# Patient Record
Sex: FEMALE | Race: WHITE | NOT HISPANIC OR LATINO | Employment: OTHER | URBAN - NONMETROPOLITAN AREA
[De-identification: names, ages, dates, MRNs, and addresses within clinical notes are randomized per-mention and may not be internally consistent; named-entity substitution may affect disease eponyms.]

---

## 2019-09-12 PROBLEM — E11.9 DIABETES MELLITUS: Status: ACTIVE | Noted: 2019-09-12

## 2019-09-12 PROBLEM — G35 MULTIPLE SCLEROSIS: Status: ACTIVE | Noted: 2019-09-12

## 2019-09-12 PROBLEM — I73.9 PAD (PERIPHERAL ARTERY DISEASE): Status: ACTIVE | Noted: 2019-09-12

## 2019-09-12 PROBLEM — L89.96 PRESSURE INJURY OF DEEP TISSUE: Status: ACTIVE | Noted: 2019-09-12

## 2019-09-12 PROBLEM — I96 GANGRENE: Status: ACTIVE | Noted: 2019-09-12

## 2019-09-12 PROBLEM — I73.9 PVD (PERIPHERAL VASCULAR DISEASE): Status: ACTIVE | Noted: 2019-09-12

## 2019-09-12 PROBLEM — I10 HYPERTENSION: Status: ACTIVE | Noted: 2019-09-12

## 2019-09-13 PROBLEM — L89.323 PRESSURE INJURY OF LEFT BUTTOCK, STAGE 3: Status: ACTIVE | Noted: 2019-09-13

## 2019-09-13 PROBLEM — L97.529 ISCHEMIC ULCER OF TOE OF LEFT FOOT: Status: ACTIVE | Noted: 2019-09-13

## 2019-09-13 PROBLEM — L89.620 PRESSURE INJURY OF LEFT HEEL, UNSTAGEABLE: Status: ACTIVE | Noted: 2019-09-13

## 2019-09-13 PROBLEM — L89.622 PRESSURE INJURY OF LEFT HEEL, STAGE 2: Status: ACTIVE | Noted: 2019-09-13

## 2019-09-13 PROBLEM — D64.9 ANEMIA: Status: ACTIVE | Noted: 2019-09-13

## 2019-09-13 PROBLEM — R63.8 INCREASED NUTRITIONAL NEEDS: Status: ACTIVE | Noted: 2019-09-13

## 2019-09-13 PROBLEM — L89.322 PRESSURE INJURY OF LEFT BUTTOCK, STAGE 2: Status: ACTIVE | Noted: 2019-09-13

## 2019-09-14 PROBLEM — K59.00 CONSTIPATION: Status: ACTIVE | Noted: 2019-09-14

## 2019-10-03 PROBLEM — E11.621 DIABETIC ULCER OF TOE OF LEFT FOOT ASSOCIATED WITH TYPE 2 DIABETES MELLITUS: Status: ACTIVE | Noted: 2019-10-03

## 2019-10-03 PROBLEM — L97.529 DIABETIC ULCER OF TOE OF LEFT FOOT ASSOCIATED WITH TYPE 2 DIABETES MELLITUS: Status: ACTIVE | Noted: 2019-10-03

## 2019-10-04 PROBLEM — L25.8 INCONTINENCE ASSOCIATED DERMATITIS: Status: ACTIVE | Noted: 2019-10-04

## 2019-10-04 PROBLEM — R32 INCONTINENCE ASSOCIATED DERMATITIS: Status: ACTIVE | Noted: 2019-10-04

## 2019-10-04 PROBLEM — L89.93 PRESSURE INJURY, STAGE 3: Status: ACTIVE | Noted: 2019-10-04

## 2019-10-07 PROBLEM — S78.119A UNILATERAL AKA: Status: ACTIVE | Noted: 2019-10-07

## 2019-10-07 PROBLEM — N30.00 ACUTE CYSTITIS WITHOUT HEMATURIA: Status: ACTIVE | Noted: 2019-10-07

## 2020-09-14 ENCOUNTER — HOSPITAL ENCOUNTER (EMERGENCY)
Facility: HOSPITAL | Age: 50
Discharge: HOME | End: 2020-09-14
Attending: FAMILY MEDICINE
Payer: MEDICAID

## 2020-09-14 VITALS
WEIGHT: 150 LBS | TEMPERATURE: 99 F | SYSTOLIC BLOOD PRESSURE: 151 MMHG | HEART RATE: 80 BPM | DIASTOLIC BLOOD PRESSURE: 48 MMHG | RESPIRATION RATE: 18 BRPM | BODY MASS INDEX: 30.24 KG/M2 | OXYGEN SATURATION: 100 % | HEIGHT: 59 IN

## 2020-09-14 DIAGNOSIS — S09.90XA INJURY OF HEAD, INITIAL ENCOUNTER: Primary | ICD-10-CM

## 2020-09-14 DIAGNOSIS — T14.8XXA ABRASION: ICD-10-CM

## 2020-09-14 PROCEDURE — 12011 RPR F/E/E/N/L/M 2.5 CM/<: CPT

## 2020-09-14 PROCEDURE — 99284 EMERGENCY DEPT VISIT MOD MDM: CPT | Mod: 25

## 2020-09-14 NOTE — ED PROVIDER NOTES
Encounter Date: 9/14/2020       History     Chief Complaint   Patient presents with    Fall     Pt in w/c leaning over to get something and fell hitting head.  Pt has lac to left brow.       49-year-old female brought in by EMS for a fall when she was leaning out her wheelchair.  She slipped and fell hitting her head on the floor.  Patient is on blood thinners.  Abrasion noted to right knee but full range of motion.  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  Laceration noted to left eyebrow, no active bleeding        Review of patient's allergies indicates:  No Known Allergies  Past Medical History:   Diagnosis Date    CVA (cerebral vascular accident) 02/2003    Diabetes mellitus     Gangrene 09/12/2019    l 5th toe    Hemiparesis     LEFT SIDE    Hyperlipidemia     Hypertension     Multiple sclerosis     PVD (peripheral vascular disease)     Venous insufficiency      Past Surgical History:   Procedure Laterality Date    ABOVE-KNEE AMPUTATION Left 10/4/2019    Procedure: AMPUTATION, ABOVE KNEE left leg;  Surgeon: Justin Lopez MD;  Location: [facility] OR;  Service: General;  Laterality: Left;    ANGIOGRAPHY OF LOWER EXTREMITY Left 9/16/2019    Procedure: Angiogram Extremity Unilateral;  Surgeon: Berhane Colindres MD;  Location: [facility] CATH;  Service: Cardiology;  Laterality: Left;    PERCUTANEOUS TRANSLUMINAL ANGIOPLASTY (PTA) OF PERIPHERAL VESSEL N/A 9/13/2019    Procedure: PTA, PERIPHERAL VESSEL;  Surgeon: Berhane Colindres MD;  Location: [facility] CATH;  Service: Cardiology;  Laterality: N/A;    TOE AMPUTATION Left 9/19/2019    Procedure: AMPUTATION, TOE;  Surgeon: Justin Lopez MD;  Location: [facility] OR;  Service: General;  Laterality: Left;    WOUND DEBRIDEMENT Left 9/19/2019    Procedure: DEBRIDEMENT, WOUND;  Surgeon: Justin Lopez MD;  Location: [facility] OR;  Service: General;  Laterality: Left;     No family history on file.  Social History     Tobacco Use    Smoking status: Never Smoker    Smokeless  tobacco: Never Used   Substance Use Topics    Alcohol use: Never     Frequency: Never    Drug use: Never     Review of Systems   Constitutional: Negative for fever.   HENT: Negative for sore throat.    Eyes: Positive for pain.   Respiratory: Negative for shortness of breath.    Cardiovascular: Negative for chest pain.   Gastrointestinal: Negative for nausea.   Genitourinary: Negative for dysuria.   Musculoskeletal: Positive for gait problem. Negative for back pain.   Skin: Positive for color change, pallor and wound. Negative for rash.   Neurological: Negative for weakness.   Hematological: Does not bruise/bleed easily.   All other systems reviewed and are negative.      Physical Exam     Initial Vitals [09/14/20 1537]   BP Pulse Resp Temp SpO2   (!) 156/45 89 18 98.5 °F (36.9 °C) 100 %      MAP       --         Physical Exam    Nursing note and vitals reviewed.  Constitutional: She appears well-developed and well-nourished.   HENT:   Head: Normocephalic. Head is with contusion and with laceration.       Eyes: Pupils are equal, round, and reactive to light.   Neck: Normal range of motion.   Cardiovascular: Normal rate and regular rhythm.   Pulmonary/Chest: Breath sounds normal.   Abdominal: Soft. Bowel sounds are normal.   Musculoskeletal: Normal range of motion.      Comments: lEFT AKA   Neurological: She is alert and oriented to person, place, and time.   MENTALLY CHALLENGED    Skin: Skin is warm and dry. There is erythema.        Psychiatric: She has a normal mood and affect.         ED Course   Lac Repair    Date/Time: 9/14/2020 4:35 PM  Performed by: Meaghan Morales NP  Authorized by: Vikash Mckeon Jr., MD   Consent Done: Emergent Situation  Body area: head/neck  Location details: left eyebrow  Laceration length: 2 cm  Foreign bodies: no foreign bodies  Tendon involvement: none  Nerve involvement: none  Vascular damage: no  Irrigation solution: tap water  Irrigation method: tap  Amount of cleaning:  standard  Debridement: none  Degree of undermining: none  Skin closure: glue  Approximation difficulty: simple  Dressing: open (no dressing)  Patient tolerance: Patient tolerated the procedure well with no immediate complications        Labs Reviewed - No data to display       Imaging Results          CT Maxillofacial Without Contrast (Final result)  Result time 09/14/20 17:35:32    Final result by James Tapia MD (09/14/20 17:35:32)                 Impression:      1. No CT evidence of acute facial fracture.  2. Left maxillary sinusitis, possibly chronic.      Electronically signed by: James Tapia MD  Date:    09/14/2020  Time:    17:35             Narrative:    EXAMINATION:  CT MAXILLOFACIAL WITHOUT CONTRAST    CLINICAL HISTORY:  Facial trauma;    TECHNIQUE:  Thin section axial images were obtained.  Multiplanar reformations were performed.  Iterative reconstruction technique was performed.    CT/cardiac nuclear exam/s in prior 12 months:  0.    COMPARISON:  None.    FINDINGS:  There is no evidence of acute facial fracture.  Globes are intact.  No retrobulbar hematoma is seen.  There is partial opacification of the left maxillary sinus suggesting sinusitis, possibly chronic.  Extensive vascular calcifications are noted.                               CT Head Without Contrast (Final result)  Result time 09/14/20 16:29:26    Final result by James Tapia MD (09/14/20 16:29:26)                 Impression:      1. Diffuse atrophy and old right frontal and left occipital infarcts.  No signs of acute hemorrhage.  2. Bilateral mastoid opacification, suggesting mastoiditis.      Electronically signed by: James Tapia MD  Date:    09/14/2020  Time:    16:29             Narrative:    EXAMINATION:  CT HEAD WITHOUT CONTRAST    CLINICAL HISTORY:  head injury, decreased mental capacity, on blood thinners;head injury, decreased mental capacity, on blood thinners;    TECHNIQUE:  Iterative reconstruction technique was  used.    CT/cardiac nuclear exam/s in prior 12 months:  0.    COMPARISON:  Head CT 07/28/2017.    FINDINGS:  There is diffuse atrophy.  There are old infarcts in the right frontal and left occipital lobes.  Diffuse abnormal white matter attenuation is also present.  No mass or mass effect.  No signs of acute hemorrhage.  There is partial opacification of the mastoid air cells bilaterally suggesting mastoiditis.                                 Medical Decision Making:   Differential Diagnosis:   LACERATION, INTRACRANIAL BLEED, RIGHT KNEE PAIN  Clinical Tests:   Radiological Study: Ordered and Reviewed                             Clinical Impression:       ICD-10-CM ICD-9-CM   1. Injury of head, initial encounter  S09.90XA 959.01   2. Abrasion  T14.8XXA 919.0             ED Disposition Condition    Discharge Stable        ED Prescriptions     None        Follow-up Information    None                                      Meaghan Morales NP  09/14/20 2725

## 2020-09-15 NOTE — ED NOTES
NEUROLOGICAL:   Patient is awake , alert , oriented to person, oriented to place and oriented to situation. Pupils are PERRL. Gait is unsteady. Pt with left BKA.  Moves all extremities without difficulty.   Patient reports no neuro complaints..  GCS 15    HEENT:   Head appears normocephalic  and symmetric .   Eyes appear WNL to both eyes. Patient reports no complaints  to both eyes .   Ears appear WNL. Patient reports no complaints  to both ears.   Nares appear patent . Patient reports no nose complaints .  Mouth appears moist, pink and teeth intact. Patient reports no mouth complaints.   Throat appears pink and moist . Patient reports no throat complaints.    CARDIOVASCULAR:   S1 and S2 present, no murmurs, gallops, or rubs, rate regular  and pulses palpable (2+)    On palpation no edema noted , noted to none.   Patient reports no CV complaints.  .   Patient vitals are WNL.    RESPIRATORY:   Airway Clear, Open, and Patent.  Respirations are even and unlabored.   Breath sounds clear  to all lung fields.   Patient reports no respiratory complaints.     GASTROINTESTINAL:   Abdomen is soft  and non-tender x 4 quadrants. Bowel sounds are normoactive to all quadrants .   Patient reports no GI complaints .     GENITOURINARY:   Patient reports no  complaints.     MUSCULOSKELETAL:   full range of motion to all extremities, no swelling noted , no tenderness noted and no weakness noted.   Patient reports no musculoskeletal complaints     SKIN:   Skin appears warm , dry , good turgor, color normal for race and intact. Patient reports no skin complaints.  Lac noted above left eyebrow with sm amt of bleeding.

## 2021-01-01 ENCOUNTER — PATIENT MESSAGE (OUTPATIENT)
Dept: NEUROLOGY | Facility: CLINIC | Age: 51
End: 2021-01-01
Payer: MEDICAID

## 2021-01-05 PROBLEM — S78.112A: Status: ACTIVE | Noted: 2021-01-05

## 2021-01-05 PROBLEM — L02.416 ABSCESS OF LEFT LEG: Status: ACTIVE | Noted: 2021-01-05

## 2021-01-09 PROBLEM — E11.9 DM TYPE 2 (DIABETES MELLITUS, TYPE 2): Chronic | Status: ACTIVE | Noted: 2019-09-12

## 2021-01-19 PROBLEM — N39.0 UTI (URINARY TRACT INFECTION): Status: ACTIVE | Noted: 2021-01-19

## 2021-01-19 PROBLEM — B85.2 LICE INFESTATION: Status: ACTIVE | Noted: 2021-01-19

## 2021-01-25 ENCOUNTER — HOSPITAL ENCOUNTER (EMERGENCY)
Facility: HOSPITAL | Age: 51
Discharge: HOME | End: 2021-01-25
Attending: EMERGENCY MEDICINE
Payer: MEDICAID

## 2021-01-25 VITALS
HEART RATE: 85 BPM | HEIGHT: 59 IN | WEIGHT: 140 LBS | RESPIRATION RATE: 18 BRPM | OXYGEN SATURATION: 100 % | DIASTOLIC BLOOD PRESSURE: 59 MMHG | BODY MASS INDEX: 28.22 KG/M2 | TEMPERATURE: 98 F | SYSTOLIC BLOOD PRESSURE: 120 MMHG

## 2021-01-25 DIAGNOSIS — Z48.89 ENCOUNTER FOR POST SURGICAL WOUND CHECK: Primary | ICD-10-CM

## 2021-01-25 PROCEDURE — 99281 EMR DPT VST MAYX REQ PHY/QHP: CPT

## 2021-02-23 PROBLEM — T81.49XA POSTOPERATIVE WOUND INFECTION: Status: ACTIVE | Noted: 2021-02-23

## 2021-05-10 ENCOUNTER — PATIENT MESSAGE (OUTPATIENT)
Dept: RESEARCH | Facility: HOSPITAL | Age: 51
End: 2021-05-10

## 2022-01-01 ENCOUNTER — PATIENT MESSAGE (OUTPATIENT)
Dept: PSYCHIATRY | Facility: CLINIC | Age: 52
End: 2022-01-01
Payer: MEDICAID

## 2022-01-01 ENCOUNTER — HOSPITAL ENCOUNTER (OUTPATIENT)
Dept: RADIOLOGY | Facility: HOSPITAL | Age: 52
Discharge: HOME | End: 2022-01-14
Attending: INTERNAL MEDICINE
Payer: MEDICAID

## 2022-01-01 ENCOUNTER — LAB VISIT (OUTPATIENT)
Dept: LAB | Facility: HOSPITAL | Age: 52
End: 2022-01-01
Attending: INTERNAL MEDICINE
Payer: MEDICAID

## 2022-01-01 ENCOUNTER — HOSPITAL ENCOUNTER (INPATIENT)
Facility: HOSPITAL | Age: 52
LOS: 2 days | DRG: 853 | End: 2022-06-18
Attending: STUDENT IN AN ORGANIZED HEALTH CARE EDUCATION/TRAINING PROGRAM | Admitting: EMERGENCY MEDICINE
Payer: MEDICAID

## 2022-01-01 VITALS
TEMPERATURE: 97 F | HEIGHT: 57 IN | WEIGHT: 125 LBS | BODY MASS INDEX: 26.97 KG/M2 | DIASTOLIC BLOOD PRESSURE: 108 MMHG | OXYGEN SATURATION: 50 % | HEART RATE: 115 BPM | SYSTOLIC BLOOD PRESSURE: 153 MMHG

## 2022-01-01 DIAGNOSIS — R65.20 SEVERE SEPSIS: ICD-10-CM

## 2022-01-01 DIAGNOSIS — I73.9 PVD (PERIPHERAL VASCULAR DISEASE): ICD-10-CM

## 2022-01-01 DIAGNOSIS — E11.621 DIABETIC ULCER OF RIGHT FOOT: ICD-10-CM

## 2022-01-01 DIAGNOSIS — R73.9 HYPERGLYCEMIA: ICD-10-CM

## 2022-01-01 DIAGNOSIS — I87.2 PERIPHERAL VENOUS INSUFFICIENCY: ICD-10-CM

## 2022-01-01 DIAGNOSIS — M86.9 DIABETIC FOOT ULCER WITH OSTEOMYELITIS: Primary | ICD-10-CM

## 2022-01-01 DIAGNOSIS — A41.9 SEVERE SEPSIS: ICD-10-CM

## 2022-01-01 DIAGNOSIS — Z00.00 ROUTINE CHECK-UP: ICD-10-CM

## 2022-01-01 DIAGNOSIS — E11.69 DIABETIC FOOT ULCER WITH OSTEOMYELITIS: Primary | ICD-10-CM

## 2022-01-01 DIAGNOSIS — Z00.00 ROUTINE CHECK-UP: Primary | ICD-10-CM

## 2022-01-01 DIAGNOSIS — Z01.818 PREOP EXAMINATION: ICD-10-CM

## 2022-01-01 DIAGNOSIS — L97.519 DIABETIC ULCER OF RIGHT FOOT: ICD-10-CM

## 2022-01-01 DIAGNOSIS — E11.621 DIABETIC FOOT ULCER WITH OSTEOMYELITIS: Primary | ICD-10-CM

## 2022-01-01 DIAGNOSIS — L97.509 DIABETIC FOOT ULCER WITH OSTEOMYELITIS: Primary | ICD-10-CM

## 2022-01-01 DIAGNOSIS — M86.171 OTHER ACUTE OSTEOMYELITIS OF RIGHT FOOT: Primary | ICD-10-CM

## 2022-01-01 LAB
ALBUMIN SERPL BCP-MCNC: 1.9 G/DL (ref 3.5–5.2)
ALBUMIN SERPL BCP-MCNC: 2.9 G/DL (ref 3.5–5.2)
ALBUMIN SERPL BCP-MCNC: 3.7 G/DL (ref 3.5–5.2)
ALP SERPL-CCNC: 106 U/L (ref 55–135)
ALP SERPL-CCNC: 65 U/L (ref 55–135)
ALP SERPL-CCNC: 87 U/L (ref 55–135)
ALT SERPL W/O P-5'-P-CCNC: 14 U/L (ref 10–44)
ALT SERPL W/O P-5'-P-CCNC: 9 U/L (ref 10–44)
ALT SERPL W/O P-5'-P-CCNC: 9 U/L (ref 10–44)
ANION GAP SERPL CALC-SCNC: 10 MMOL/L (ref 8–16)
ANION GAP SERPL CALC-SCNC: 5 MMOL/L (ref 8–16)
ANION GAP SERPL CALC-SCNC: 5 MMOL/L (ref 8–16)
APTT BLDCRRT: 27 SEC (ref 21–32)
AST SERPL-CCNC: 11 U/L (ref 10–40)
AST SERPL-CCNC: 11 U/L (ref 10–40)
AST SERPL-CCNC: 24 U/L (ref 10–40)
BACTERIA #/AREA URNS HPF: NEGATIVE /HPF
BACTERIA UR CULT: NO GROWTH
BASOPHILS # BLD AUTO: 0.04 K/UL (ref 0–0.2)
BASOPHILS # BLD AUTO: 0.05 K/UL (ref 0–0.2)
BASOPHILS # BLD AUTO: 0.07 K/UL (ref 0–0.2)
BASOPHILS NFR BLD: 0.4 % (ref 0–1.9)
BASOPHILS NFR BLD: 0.6 % (ref 0–1.9)
BASOPHILS NFR BLD: 0.6 % (ref 0–1.9)
BILIRUB SERPL-MCNC: 0.3 MG/DL (ref 0.1–1)
BILIRUB SERPL-MCNC: 0.3 MG/DL (ref 0.1–1)
BILIRUB SERPL-MCNC: 0.4 MG/DL (ref 0.1–1)
BILIRUB UR QL STRIP: NEGATIVE
BIPAP: ABNORMAL
BUN SERPL-MCNC: 15 MG/DL (ref 6–20)
BUN SERPL-MCNC: 19 MG/DL (ref 6–20)
BUN SERPL-MCNC: 30 MG/DL (ref 6–20)
CALCIUM SERPL-MCNC: 7.5 MG/DL (ref 8.7–10.5)
CALCIUM SERPL-MCNC: 9.3 MG/DL (ref 8.7–10.5)
CALCIUM SERPL-MCNC: 9.8 MG/DL (ref 8.7–10.5)
CHLORIDE SERPL-SCNC: 106 MMOL/L (ref 95–110)
CHLORIDE SERPL-SCNC: 111 MMOL/L (ref 95–110)
CHLORIDE SERPL-SCNC: 111 MMOL/L (ref 95–110)
CLARITY UR: ABNORMAL
CO2 SERPL-SCNC: 18 MMOL/L (ref 23–29)
CO2 SERPL-SCNC: 25 MMOL/L (ref 23–29)
CO2 SERPL-SCNC: 25 MMOL/L (ref 23–29)
COLOR UR: YELLOW
CORRECTED TEMPERATURE (PCO2): 29 MMHG
CORRECTED TEMPERATURE (PCO2): 49.6 MMHG
CORRECTED TEMPERATURE (PH): 7.19
CORRECTED TEMPERATURE (PH): 7.28
CORRECTED TEMPERATURE (PO2): 54.1 MMHG
CORRECTED TEMPERATURE (PO2): 58.3 MMHG
CREAT SERPL-MCNC: 0.7 MG/DL (ref 0.5–1.4)
CREAT SERPL-MCNC: 0.9 MG/DL (ref 0.5–1.4)
CREAT SERPL-MCNC: 1 MG/DL (ref 0.5–1.4)
CRP SERPL-MCNC: 8.17 MG/DL (ref 0–0.75)
CTP QC/QA: YES
DIFFERENTIAL METHOD: ABNORMAL
EOSINOPHIL # BLD AUTO: 0 K/UL (ref 0–0.5)
EOSINOPHIL # BLD AUTO: 0.3 K/UL (ref 0–0.5)
EOSINOPHIL # BLD AUTO: 0.3 K/UL (ref 0–0.5)
EOSINOPHIL NFR BLD: 0.1 % (ref 0–8)
EOSINOPHIL NFR BLD: 3.1 % (ref 0–8)
EOSINOPHIL NFR BLD: 4.1 % (ref 0–8)
ERYTHROCYTE [DISTWIDTH] IN BLOOD BY AUTOMATED COUNT: 12.9 % (ref 11.5–14.5)
ERYTHROCYTE [DISTWIDTH] IN BLOOD BY AUTOMATED COUNT: 13.2 % (ref 11.5–14.5)
EST. GFR  (AFRICAN AMERICAN): >60 ML/MIN/1.73 M^2
EST. GFR  (NON AFRICAN AMERICAN): >60 ML/MIN/1.73 M^2
ESTIMATED AVG GLUCOSE: 140 MG/DL (ref 68–131)
ESTIMATED AVG GLUCOSE: 148 MG/DL (ref 68–131)
FIO2: 100 %
FIO2: 40 %
GLUCOSE SERPL-MCNC: 105 MG/DL (ref 70–110)
GLUCOSE SERPL-MCNC: 161 MG/DL (ref 70–110)
GLUCOSE SERPL-MCNC: 355 MG/DL (ref 70–110)
GLUCOSE UR QL STRIP: ABNORMAL
HBA1C MFR BLD: 6.5 % (ref 4–5.6)
HBA1C MFR BLD: 6.8 % (ref 4–5.6)
HCT VFR BLD AUTO: 25.1 % (ref 37–48.5)
HCT VFR BLD AUTO: 25.6 % (ref 37–48.5)
HCT VFR BLD AUTO: 29.3 % (ref 37–48.5)
HCT VFR BLD AUTO: 39.4 % (ref 37–48.5)
HGB BLD-MCNC: 12.7 G/DL (ref 12–16)
HGB BLD-MCNC: 7.6 G/DL (ref 12–16)
HGB BLD-MCNC: 7.8 G/DL (ref 12–16)
HGB BLD-MCNC: 9 G/DL (ref 12–16)
HGB UR QL STRIP: ABNORMAL
HYALINE CASTS #/AREA URNS LPF: 4.4 /LPF
IMM GRANULOCYTES # BLD AUTO: 0.01 K/UL (ref 0–0.04)
IMM GRANULOCYTES # BLD AUTO: 0.01 K/UL (ref 0–0.04)
IMM GRANULOCYTES # BLD AUTO: 0.11 K/UL (ref 0–0.04)
IMM GRANULOCYTES NFR BLD AUTO: 0.1 % (ref 0–0.5)
IMM GRANULOCYTES NFR BLD AUTO: 0.1 % (ref 0–0.5)
IMM GRANULOCYTES NFR BLD AUTO: 0.7 % (ref 0–0.5)
INR PPP: 0.9 (ref 0.8–1.2)
KETONES UR QL STRIP: ABNORMAL
LACTATE SERPL-SCNC: 2.2 MMOL/L (ref 0.5–2.2)
LACTATE SERPL-SCNC: 2.3 MMOL/L (ref 0.5–2.2)
LEUKOCYTE ESTERASE UR QL STRIP: ABNORMAL
LYMPHOCYTES # BLD AUTO: 0.6 K/UL (ref 1–4.8)
LYMPHOCYTES # BLD AUTO: 0.9 K/UL (ref 1–4.8)
LYMPHOCYTES # BLD AUTO: 1.2 K/UL (ref 1–4.8)
LYMPHOCYTES NFR BLD: 10.6 % (ref 18–48)
LYMPHOCYTES NFR BLD: 16.8 % (ref 18–48)
LYMPHOCYTES NFR BLD: 3.5 % (ref 18–48)
Lab: ABNORMAL
Lab: ABNORMAL
MAGNESIUM SERPL-MCNC: 0.9 MG/DL (ref 1.6–2.6)
MCH RBC QN AUTO: 26.2 PG (ref 27–31)
MCH RBC QN AUTO: 26.4 PG (ref 27–31)
MCH RBC QN AUTO: 26.5 PG (ref 27–31)
MCH RBC QN AUTO: 29.3 PG (ref 27–31)
MCHC RBC AUTO-ENTMCNC: 30.3 G/DL (ref 32–36)
MCHC RBC AUTO-ENTMCNC: 30.5 G/DL (ref 32–36)
MCHC RBC AUTO-ENTMCNC: 30.7 G/DL (ref 32–36)
MCHC RBC AUTO-ENTMCNC: 32.2 G/DL (ref 32–36)
MCV RBC AUTO: 85 FL (ref 82–98)
MCV RBC AUTO: 87 FL (ref 82–98)
MCV RBC AUTO: 87 FL (ref 82–98)
MCV RBC AUTO: 91 FL (ref 82–98)
MICROSCOPIC COMMENT: ABNORMAL
MODIFIED ALLEN'S TEST: ABNORMAL
MODIFIED ALLEN'S TEST: ABNORMAL
MONOCYTES # BLD AUTO: 0.4 K/UL (ref 0.3–1)
MONOCYTES NFR BLD: 2.2 % (ref 4–15)
MONOCYTES NFR BLD: 4.5 % (ref 4–15)
MONOCYTES NFR BLD: 5.1 % (ref 4–15)
NEUTROPHILS # BLD AUTO: 15.5 K/UL (ref 1.8–7.7)
NEUTROPHILS # BLD AUTO: 5.1 K/UL (ref 1.8–7.7)
NEUTROPHILS # BLD AUTO: 6.5 K/UL (ref 1.8–7.7)
NEUTROPHILS NFR BLD: 73.3 % (ref 38–73)
NEUTROPHILS NFR BLD: 81.1 % (ref 38–73)
NEUTROPHILS NFR BLD: 93.1 % (ref 38–73)
NITRITE UR QL STRIP: POSITIVE
NOTIFIED BY: ABNORMAL
NOTIFIED BY: ABNORMAL
NRBC BLD-RTO: 0 /100 WBC
O2DEVICE: ABNORMAL
O2DEVICE: ABNORMAL
PCO2 BLDA: 29 MMHG (ref 35–45)
PCO2 BLDA: 49.6 MMHG (ref 35–45)
PH SMN: 7.19 [PH] (ref 7.34–7.45)
PH SMN: 7.28 [PH] (ref 7.34–7.45)
PH UR STRIP: 8 [PH] (ref 5–8)
PLATELET # BLD AUTO: 115 K/UL (ref 150–450)
PLATELET # BLD AUTO: 152 K/UL (ref 150–450)
PLATELET # BLD AUTO: 186 K/UL (ref 150–450)
PLATELET # BLD AUTO: 211 K/UL (ref 150–450)
PMV BLD AUTO: 12.9 FL (ref 9.2–12.9)
PMV BLD AUTO: 13.1 FL (ref 9.2–12.9)
PMV BLD AUTO: 13.4 FL (ref 9.2–12.9)
PMV BLD AUTO: 13.4 FL (ref 9.2–12.9)
PO2 BLDA: 54.1 MMHG (ref 80–100)
PO2 BLDA: 58.3 MMHG (ref 80–100)
POC BASE DEFICIT: -13.3 MMOL/L
POC BASE DEFICIT: -9.3 MMOL/L
POC HCO3: 14.7 MMOL/L
POC HCO3: 17 MMOL/L
POC NOTIFIED NOTE: ABNORMAL
POC NOTIFIED NOTE: ABNORMAL
POC PERFORMED BY: ABNORMAL
POC PERFORMED BY: ABNORMAL
POC SATURATED O2: 76.7 %
POC SATURATED O2: 86.2 %
POC TCO2: 13.2 MMOL/L
POC TCO2: 18.8 MMOL/L
POC TEMPERATURE: 37 C
POC TEMPERATURE: 37 C
POCT GLUCOSE: 138 MG/DL (ref 70–110)
POCT GLUCOSE: 173 MG/DL (ref 70–110)
POCT GLUCOSE: 224 MG/DL (ref 70–110)
POCT GLUCOSE: 247 MG/DL (ref 70–110)
POTASSIUM SERPL-SCNC: 3.9 MMOL/L (ref 3.5–5.1)
POTASSIUM SERPL-SCNC: 4.1 MMOL/L (ref 3.5–5.1)
POTASSIUM SERPL-SCNC: 4.6 MMOL/L (ref 3.5–5.1)
PROCALCITONIN SERPL IA-MCNC: 0.14 NG/ML
PROT SERPL-MCNC: 6.4 G/DL (ref 6–8.4)
PROT SERPL-MCNC: 8.7 G/DL (ref 6–8.4)
PROT SERPL-MCNC: 9.1 G/DL (ref 6–8.4)
PROT UR QL STRIP: ABNORMAL
PROTHROMBIN TIME: 10.5 SEC (ref 9–12.5)
PROVIDER NOTIFIED: ABNORMAL
PROVIDER NOTIFIED: ABNORMAL
RBC # BLD AUTO: 2.88 M/UL (ref 4–5.4)
RBC # BLD AUTO: 2.94 M/UL (ref 4–5.4)
RBC # BLD AUTO: 3.44 M/UL (ref 4–5.4)
RBC # BLD AUTO: 4.34 M/UL (ref 4–5.4)
RBC #/AREA URNS HPF: 83 /HPF (ref 0–4)
SARS-COV-2 RDRP RESP QL NAA+PROBE: NEGATIVE
SODIUM SERPL-SCNC: 136 MMOL/L (ref 136–145)
SODIUM SERPL-SCNC: 139 MMOL/L (ref 136–145)
SODIUM SERPL-SCNC: 141 MMOL/L (ref 136–145)
SP GR UR STRIP: 1.02 (ref 1–1.03)
SPECIMEN SOURCE: ABNORMAL
SPECIMEN SOURCE: ABNORMAL
SQUAMOUS #/AREA URNS HPF: 1 /HPF
URN SPEC COLLECT METH UR: ABNORMAL
UROBILINOGEN UR STRIP-ACNC: 1 EU/DL
VANCOMYCIN TROUGH SERPL-MCNC: 35.6 UG/ML (ref 10–22)
WBC # BLD AUTO: 16.65 K/UL (ref 3.9–12.7)
WBC # BLD AUTO: 20.58 K/UL (ref 3.9–12.7)
WBC # BLD AUTO: 6.91 K/UL (ref 3.9–12.7)
WBC # BLD AUTO: 7.99 K/UL (ref 3.9–12.7)
WBC #/AREA URNS HPF: >100 /HPF (ref 0–5)

## 2022-01-01 PROCEDURE — 20000000 HC ICU ROOM

## 2022-01-01 PROCEDURE — 81000 URINALYSIS NONAUTO W/SCOPE: CPT | Performed by: INTERNAL MEDICINE

## 2022-01-01 PROCEDURE — 87186 SC STD MICRODIL/AGAR DIL: CPT | Performed by: STUDENT IN AN ORGANIZED HEALTH CARE EDUCATION/TRAINING PROGRAM

## 2022-01-01 PROCEDURE — C1751 CATH, INF, PER/CENT/MIDLINE: HCPCS

## 2022-01-01 PROCEDURE — 51702 INSERT TEMP BLADDER CATH: CPT

## 2022-01-01 PROCEDURE — 85610 PROTHROMBIN TIME: CPT | Performed by: STUDENT IN AN ORGANIZED HEALTH CARE EDUCATION/TRAINING PROGRAM

## 2022-01-01 PROCEDURE — 87186 SC STD MICRODIL/AGAR DIL: CPT | Performed by: PODIATRIST

## 2022-01-01 PROCEDURE — 80202 ASSAY OF VANCOMYCIN: CPT | Performed by: INTERNAL MEDICINE

## 2022-01-01 PROCEDURE — 25000003 PHARM REV CODE 250: Performed by: INTERNAL MEDICINE

## 2022-01-01 PROCEDURE — 83735 ASSAY OF MAGNESIUM: CPT | Performed by: INTERNAL MEDICINE

## 2022-01-01 PROCEDURE — 80053 COMPREHEN METABOLIC PANEL: CPT | Performed by: INTERNAL MEDICINE

## 2022-01-01 PROCEDURE — 87077 CULTURE AEROBIC IDENTIFY: CPT | Performed by: PODIATRIST

## 2022-01-01 PROCEDURE — 85025 COMPLETE CBC W/AUTO DIFF WBC: CPT | Performed by: INTERNAL MEDICINE

## 2022-01-01 PROCEDURE — 87075 CULTR BACTERIA EXCEPT BLOOD: CPT | Performed by: PODIATRIST

## 2022-01-01 PROCEDURE — 25000003 PHARM REV CODE 250: Performed by: STUDENT IN AN ORGANIZED HEALTH CARE EDUCATION/TRAINING PROGRAM

## 2022-01-01 PROCEDURE — 27000207 HC ISOLATION

## 2022-01-01 PROCEDURE — 36415 COLL VENOUS BLD VENIPUNCTURE: CPT | Performed by: STUDENT IN AN ORGANIZED HEALTH CARE EDUCATION/TRAINING PROGRAM

## 2022-01-01 PROCEDURE — 96367 TX/PROPH/DG ADDL SEQ IV INF: CPT

## 2022-01-01 PROCEDURE — 99900035 HC TECH TIME PER 15 MIN (STAT)

## 2022-01-01 PROCEDURE — 96365 THER/PROPH/DIAG IV INF INIT: CPT

## 2022-01-01 PROCEDURE — 84145 PROCALCITONIN (PCT): CPT | Performed by: INTERNAL MEDICINE

## 2022-01-01 PROCEDURE — 63600175 PHARM REV CODE 636 W HCPCS: Performed by: INTERNAL MEDICINE

## 2022-01-01 PROCEDURE — 36600 WITHDRAWAL OF ARTERIAL BLOOD: CPT

## 2022-01-01 PROCEDURE — 94660 CPAP INITIATION&MGMT: CPT

## 2022-01-01 PROCEDURE — 36415 COLL VENOUS BLD VENIPUNCTURE: CPT | Performed by: INTERNAL MEDICINE

## 2022-01-01 PROCEDURE — 92950 HEART/LUNG RESUSCITATION CPR: CPT

## 2022-01-01 PROCEDURE — 99900031 HC PATIENT EDUCATION (STAT)

## 2022-01-01 PROCEDURE — 82962 GLUCOSE BLOOD TEST: CPT

## 2022-01-01 PROCEDURE — 99291 CRITICAL CARE FIRST HOUR: CPT | Mod: 25

## 2022-01-01 PROCEDURE — 87040 BLOOD CULTURE FOR BACTERIA: CPT | Performed by: STUDENT IN AN ORGANIZED HEALTH CARE EDUCATION/TRAINING PROGRAM

## 2022-01-01 PROCEDURE — 36410 VNPNXR 3YR/> PHY/QHP DX/THER: CPT

## 2022-01-01 PROCEDURE — 85025 COMPLETE CBC W/AUTO DIFF WBC: CPT | Performed by: STUDENT IN AN ORGANIZED HEALTH CARE EDUCATION/TRAINING PROGRAM

## 2022-01-01 PROCEDURE — 85027 COMPLETE CBC AUTOMATED: CPT | Performed by: INTERNAL MEDICINE

## 2022-01-01 PROCEDURE — 27000221 HC OXYGEN, UP TO 24 HOURS

## 2022-01-01 PROCEDURE — 27000190 HC CPAP FULL FACE MASK W/VALVE

## 2022-01-01 PROCEDURE — 86140 C-REACTIVE PROTEIN: CPT | Performed by: STUDENT IN AN ORGANIZED HEALTH CARE EDUCATION/TRAINING PROGRAM

## 2022-01-01 PROCEDURE — 87086 URINE CULTURE/COLONY COUNT: CPT | Performed by: INTERNAL MEDICINE

## 2022-01-01 PROCEDURE — 11000001 HC ACUTE MED/SURG PRIVATE ROOM

## 2022-01-01 PROCEDURE — 36556 INSERT NON-TUNNEL CV CATH: CPT

## 2022-01-01 PROCEDURE — 82803 BLOOD GASES ANY COMBINATION: CPT

## 2022-01-01 PROCEDURE — 83605 ASSAY OF LACTIC ACID: CPT | Mod: 91 | Performed by: STUDENT IN AN ORGANIZED HEALTH CARE EDUCATION/TRAINING PROGRAM

## 2022-01-01 PROCEDURE — 63600175 PHARM REV CODE 636 W HCPCS: Performed by: STUDENT IN AN ORGANIZED HEALTH CARE EDUCATION/TRAINING PROGRAM

## 2022-01-01 PROCEDURE — 76937 US GUIDE VASCULAR ACCESS: CPT

## 2022-01-01 PROCEDURE — 94761 N-INVAS EAR/PLS OXIMETRY MLT: CPT

## 2022-01-01 PROCEDURE — 80053 COMPREHEN METABOLIC PANEL: CPT | Performed by: STUDENT IN AN ORGANIZED HEALTH CARE EDUCATION/TRAINING PROGRAM

## 2022-01-01 PROCEDURE — 71045 X-RAY EXAM CHEST 1 VIEW: CPT | Mod: TC

## 2022-01-01 PROCEDURE — 83036 HEMOGLOBIN GLYCOSYLATED A1C: CPT | Performed by: INTERNAL MEDICINE

## 2022-01-01 PROCEDURE — 87077 CULTURE AEROBIC IDENTIFY: CPT | Performed by: STUDENT IN AN ORGANIZED HEALTH CARE EDUCATION/TRAINING PROGRAM

## 2022-01-01 PROCEDURE — 87070 CULTURE OTHR SPECIMN AEROBIC: CPT | Performed by: PODIATRIST

## 2022-01-01 PROCEDURE — U0002 COVID-19 LAB TEST NON-CDC: HCPCS | Performed by: STUDENT IN AN ORGANIZED HEALTH CARE EDUCATION/TRAINING PROGRAM

## 2022-01-01 PROCEDURE — 85730 THROMBOPLASTIN TIME PARTIAL: CPT | Performed by: STUDENT IN AN ORGANIZED HEALTH CARE EDUCATION/TRAINING PROGRAM

## 2022-01-01 RX ORDER — INSULIN ASPART 100 [IU]/ML
1-10 INJECTION, SOLUTION INTRAVENOUS; SUBCUTANEOUS
Status: DISCONTINUED | OUTPATIENT
Start: 2022-01-01 | End: 2022-01-01 | Stop reason: HOSPADM

## 2022-01-01 RX ORDER — FUROSEMIDE 10 MG/ML
20 INJECTION INTRAMUSCULAR; INTRAVENOUS ONCE
Status: COMPLETED | OUTPATIENT
Start: 2022-01-01 | End: 2022-01-01

## 2022-01-01 RX ORDER — MUPIROCIN 20 MG/G
OINTMENT TOPICAL 2 TIMES DAILY
Status: DISCONTINUED | OUTPATIENT
Start: 2022-01-01 | End: 2022-01-01 | Stop reason: HOSPADM

## 2022-01-01 RX ORDER — NOREPINEPHRINE BITARTRATE/D5W 8 MG/250ML
0-3 PLASTIC BAG, INJECTION (ML) INTRAVENOUS CONTINUOUS
Status: DISCONTINUED | OUTPATIENT
Start: 2022-01-01 | End: 2022-01-01 | Stop reason: HOSPADM

## 2022-01-01 RX ORDER — INSULIN ASPART 100 [IU]/ML
0-5 INJECTION, SOLUTION INTRAVENOUS; SUBCUTANEOUS
Status: DISCONTINUED | OUTPATIENT
Start: 2022-01-01 | End: 2022-01-01 | Stop reason: SDUPTHER

## 2022-01-01 RX ORDER — GLUCAGON 1 MG
1 KIT INJECTION
Status: DISCONTINUED | OUTPATIENT
Start: 2022-01-01 | End: 2022-01-01

## 2022-01-01 RX ORDER — NOREPINEPHRINE BITARTRATE/D5W 4MG/250ML
0-.2 PLASTIC BAG, INJECTION (ML) INTRAVENOUS CONTINUOUS
Status: DISCONTINUED | OUTPATIENT
Start: 2022-01-01 | End: 2022-01-01

## 2022-01-01 RX ORDER — MAGNESIUM SULFATE HEPTAHYDRATE 40 MG/ML
2 INJECTION, SOLUTION INTRAVENOUS ONCE
Status: COMPLETED | OUTPATIENT
Start: 2022-01-01 | End: 2022-01-01

## 2022-01-01 RX ORDER — ACETAMINOPHEN 325 MG/1
650 TABLET ORAL EVERY 8 HOURS PRN
Status: DISCONTINUED | OUTPATIENT
Start: 2022-01-01 | End: 2022-01-01 | Stop reason: HOSPADM

## 2022-01-01 RX ORDER — SODIUM CHLORIDE 9 MG/ML
INJECTION, SOLUTION INTRAVENOUS CONTINUOUS
Status: DISCONTINUED | OUTPATIENT
Start: 2022-01-01 | End: 2022-01-01

## 2022-01-01 RX ORDER — NOREPINEPHRINE BITARTRATE/D5W 4MG/250ML
PLASTIC BAG, INJECTION (ML) INTRAVENOUS
Status: DISPENSED
Start: 2022-01-01 | End: 2022-01-01

## 2022-01-01 RX ORDER — SUCCINYLCHOLINE CHLORIDE 20 MG/ML
INJECTION INTRAMUSCULAR; INTRAVENOUS
Status: DISCONTINUED
Start: 2022-01-01 | End: 2022-01-01 | Stop reason: WASHOUT

## 2022-01-01 RX ORDER — ACETAMINOPHEN 325 MG/1
650 TABLET ORAL EVERY 6 HOURS PRN
Status: DISCONTINUED | OUTPATIENT
Start: 2022-01-01 | End: 2022-01-01

## 2022-01-01 RX ORDER — ONDANSETRON 2 MG/ML
4 INJECTION INTRAMUSCULAR; INTRAVENOUS EVERY 8 HOURS PRN
Status: DISCONTINUED | OUTPATIENT
Start: 2022-01-01 | End: 2022-01-01 | Stop reason: HOSPADM

## 2022-01-01 RX ORDER — TALC
6 POWDER (GRAM) TOPICAL NIGHTLY PRN
Status: DISCONTINUED | OUTPATIENT
Start: 2022-01-01 | End: 2022-01-01 | Stop reason: HOSPADM

## 2022-01-01 RX ORDER — IBUPROFEN 200 MG
24 TABLET ORAL
Status: DISCONTINUED | OUTPATIENT
Start: 2022-01-01 | End: 2022-01-01

## 2022-01-01 RX ORDER — PROPOFOL 10 MG/ML
INJECTION, EMULSION INTRAVENOUS
Status: DISCONTINUED
Start: 2022-01-01 | End: 2022-01-01 | Stop reason: WASHOUT

## 2022-01-01 RX ORDER — NOREPINEPHRINE BITARTRATE/D5W 4MG/250ML
0-3 PLASTIC BAG, INJECTION (ML) INTRAVENOUS CONTINUOUS
Status: DISCONTINUED | OUTPATIENT
Start: 2022-01-01 | End: 2022-01-01

## 2022-01-01 RX ORDER — ROCURONIUM BROMIDE 10 MG/ML
INJECTION, SOLUTION INTRAVENOUS
Status: DISCONTINUED
Start: 2022-01-01 | End: 2022-01-01 | Stop reason: WASHOUT

## 2022-01-01 RX ORDER — IBUPROFEN 200 MG
16 TABLET ORAL
Status: DISCONTINUED | OUTPATIENT
Start: 2022-01-01 | End: 2022-01-01 | Stop reason: HOSPADM

## 2022-01-01 RX ORDER — GLUCAGON 1 MG
1 KIT INJECTION
Status: DISCONTINUED | OUTPATIENT
Start: 2022-01-01 | End: 2022-01-01 | Stop reason: HOSPADM

## 2022-01-01 RX ORDER — SODIUM CHLORIDE 0.9 % (FLUSH) 0.9 %
10 SYRINGE (ML) INJECTION
Status: DISCONTINUED | OUTPATIENT
Start: 2022-01-01 | End: 2022-01-01 | Stop reason: HOSPADM

## 2022-01-01 RX ORDER — ACETAMINOPHEN 325 MG/1
650 TABLET ORAL EVERY 6 HOURS PRN
Status: DISCONTINUED | OUTPATIENT
Start: 2022-01-01 | End: 2022-01-01 | Stop reason: HOSPADM

## 2022-01-01 RX ORDER — IBUPROFEN 200 MG
24 TABLET ORAL
Status: DISCONTINUED | OUTPATIENT
Start: 2022-01-01 | End: 2022-01-01 | Stop reason: HOSPADM

## 2022-01-01 RX ORDER — PERMETHRIN 50 MG/G
CREAM TOPICAL ONCE
Status: COMPLETED | OUTPATIENT
Start: 2022-01-01 | End: 2022-01-01

## 2022-01-01 RX ORDER — ENOXAPARIN SODIUM 100 MG/ML
40 INJECTION SUBCUTANEOUS EVERY 24 HOURS
Status: DISCONTINUED | OUTPATIENT
Start: 2022-01-01 | End: 2022-01-01 | Stop reason: HOSPADM

## 2022-01-01 RX ORDER — IBUPROFEN 200 MG
16 TABLET ORAL
Status: DISCONTINUED | OUTPATIENT
Start: 2022-01-01 | End: 2022-01-01

## 2022-01-01 RX ORDER — DIPHENHYDRAMINE HYDROCHLORIDE 50 MG/ML
25 INJECTION INTRAMUSCULAR; INTRAVENOUS EVERY 6 HOURS PRN
Status: DISCONTINUED | OUTPATIENT
Start: 2022-01-01 | End: 2022-01-01 | Stop reason: HOSPADM

## 2022-01-01 RX ORDER — ETOMIDATE 2 MG/ML
INJECTION INTRAVENOUS
Status: DISCONTINUED
Start: 2022-01-01 | End: 2022-01-01 | Stop reason: WASHOUT

## 2022-01-01 RX ADMIN — SODIUM CHLORIDE: 0.9 INJECTION, SOLUTION INTRAVENOUS at 11:06

## 2022-01-01 RX ADMIN — VANCOMYCIN HYDROCHLORIDE 1500 MG: 1.5 INJECTION, POWDER, LYOPHILIZED, FOR SOLUTION INTRAVENOUS at 12:06

## 2022-01-01 RX ADMIN — PERMETHRIN: 50 CREAM TOPICAL at 08:06

## 2022-01-01 RX ADMIN — PIPERACILLIN AND TAZOBACTAM 4.5 G: 4; .5 INJECTION, POWDER, LYOPHILIZED, FOR SOLUTION INTRAVENOUS; PARENTERAL at 12:06

## 2022-01-01 RX ADMIN — SODIUM CHLORIDE 1000 ML: 0.9 INJECTION, SOLUTION INTRAVENOUS at 09:06

## 2022-01-01 RX ADMIN — ENOXAPARIN SODIUM 40 MG: 40 INJECTION SUBCUTANEOUS at 04:06

## 2022-01-01 RX ADMIN — VANCOMYCIN HYDROCHLORIDE 750 MG: 750 INJECTION, POWDER, LYOPHILIZED, FOR SOLUTION INTRAVENOUS at 01:06

## 2022-01-01 RX ADMIN — MAGNESIUM SULFATE IN WATER 2 G: 40 INJECTION, SOLUTION INTRAVENOUS at 10:06

## 2022-01-01 RX ADMIN — INSULIN ASPART 5 UNITS: 100 INJECTION, SOLUTION INTRAVENOUS; SUBCUTANEOUS at 06:06

## 2022-01-01 RX ADMIN — PIPERACILLIN AND TAZOBACTAM 4.5 G: 4; .5 INJECTION, POWDER, LYOPHILIZED, FOR SOLUTION INTRAVENOUS; PARENTERAL at 11:06

## 2022-01-01 RX ADMIN — FUROSEMIDE 20 MG: 10 INJECTION, SOLUTION INTRAMUSCULAR; INTRAVENOUS at 09:06

## 2022-01-01 RX ADMIN — MAGNESIUM SULFATE HEPTAHYDRATE 2 G: 40 INJECTION, SOLUTION INTRAVENOUS at 07:06

## 2022-01-01 RX ADMIN — LORAZEPAM 0.5 MG: 2 INJECTION INTRAMUSCULAR; INTRAVENOUS at 04:06

## 2022-01-01 RX ADMIN — Medication 0.22 MCG/KG/MIN: at 08:06

## 2022-01-01 RX ADMIN — FUROSEMIDE 20 MG: 10 INJECTION, SOLUTION INTRAMUSCULAR; INTRAVENOUS at 06:06

## 2022-01-01 RX ADMIN — SODIUM CHLORIDE, SODIUM LACTATE, POTASSIUM CHLORIDE, AND CALCIUM CHLORIDE 1000 ML: .6; .31; .03; .02 INJECTION, SOLUTION INTRAVENOUS at 06:06

## 2022-01-01 RX ADMIN — SODIUM CHLORIDE, SODIUM LACTATE, POTASSIUM CHLORIDE, AND CALCIUM CHLORIDE 1000 ML: .6; .31; .03; .02 INJECTION, SOLUTION INTRAVENOUS at 01:06

## 2022-01-01 RX ADMIN — PIPERACILLIN AND TAZOBACTAM 4.5 G: 4; .5 INJECTION, POWDER, LYOPHILIZED, FOR SOLUTION INTRAVENOUS; PARENTERAL at 08:06

## 2022-01-01 RX ADMIN — MUPIROCIN: 20 OINTMENT TOPICAL at 08:06

## 2022-01-01 RX ADMIN — SODIUM CHLORIDE: 0.9 INJECTION, SOLUTION INTRAVENOUS at 04:06

## 2022-01-01 RX ADMIN — VANCOMYCIN HYDROCHLORIDE 750 MG: 750 INJECTION, POWDER, LYOPHILIZED, FOR SOLUTION INTRAVENOUS at 10:06

## 2022-01-01 RX ADMIN — Medication 0.08 MCG/KG/MIN: at 11:06

## 2022-01-01 RX ADMIN — INSULIN ASPART 4 UNITS: 100 INJECTION, SOLUTION INTRAVENOUS; SUBCUTANEOUS at 04:06

## 2022-01-01 RX ADMIN — PIPERACILLIN AND TAZOBACTAM 4.5 G: 4; .5 INJECTION, POWDER, LYOPHILIZED, FOR SOLUTION INTRAVENOUS; PARENTERAL at 04:06

## 2022-01-01 RX ADMIN — SODIUM CHLORIDE 1000 ML: 0.9 INJECTION, SOLUTION INTRAVENOUS at 11:06

## 2022-01-01 RX ADMIN — SODIUM BICARBONATE: 84 INJECTION, SOLUTION INTRAVENOUS at 04:06

## 2022-01-01 RX ADMIN — ACETAMINOPHEN 650 MG: 325 TABLET ORAL at 04:06

## 2022-01-01 RX ADMIN — NOREPINEPHRINE BITARTRATE 0.02 MCG/KG/MIN: 4 INJECTION, SOLUTION INTRAVENOUS at 02:06

## 2022-01-01 RX ADMIN — PERMETHRIN: 50 CREAM TOPICAL at 09:06

## 2022-06-16 NOTE — NURSING
Received patient to unit, went to change wet diapers, pt had on diaper with pull up on time, while changing something crawling was noted on diaper the patient starting scratching head saw more crawling on fore head, Dr Pastrana notified and infection control and patient placed in isolation

## 2022-06-16 NOTE — ED PROVIDER NOTES
Encounter Date: 6/16/2022       History     Chief Complaint   Patient presents with    Diabetic Foot Ulcer     Sent by PCP for foot ulcer to right foot x 3 weeks.      51-year-old female with history of CVA with left-sided hemiparesis, hypertension, high cholesterol, diabetes, left AKA presents for evaluation of diabetic ulcer noted on right foot for the past 3 weeks progressively gotten worse.  Patient was at PCP earlier today and was instructed to come to the emergency department for evaluation.  Patient denies any vomiting, fever, chills.  Not on any antibiotics has follow-up with podiatrist on Tuesday        Review of patient's allergies indicates:  No Known Allergies  Past Medical History:   Diagnosis Date    CVA (cerebral vascular accident) 02/2003    Diabetes mellitus     Gangrene 09/12/2019    l 5th toe    Hemiparesis     LEFT SIDE    Hyperlipidemia     Hypertension     Lice     6 weeks ago- family states she was treated for it    Multiple sclerosis     PVD (peripheral vascular disease)     Venous insufficiency      Past Surgical History:   Procedure Laterality Date    ABOVE-KNEE AMPUTATION Left 10/4/2019    Procedure: AMPUTATION, ABOVE KNEE left leg;  Surgeon: Justin Lopez MD;  Location: [facility] OR;  Service: General;  Laterality: Left;    ANGIOGRAPHY OF LOWER EXTREMITY Left 9/16/2019    Procedure: Angiogram Extremity Unilateral;  Surgeon: Berhane Colindres MD;  Location: [facility] CATH;  Service: Cardiology;  Laterality: Left;    DISARTICULATION OF HIP Left 1/13/2021    Procedure: DISARTICULATION, HIP;  Surgeon: THOM Whiteside II, MD;  Location: [facility] OR;  Service: Orthopedics;  Laterality: Left;  leg stump open and draining     PERCUTANEOUS TRANSLUMINAL ANGIOPLASTY (PTA) OF PERIPHERAL VESSEL N/A 9/13/2019    Procedure: PTA, PERIPHERAL VESSEL;  Surgeon: Berhane Colindres MD;  Location: [facility] CATH;  Service: Cardiology;  Laterality: N/A;    TOE AMPUTATION Left 9/19/2019    Procedure:  AMPUTATION, TOE;  Surgeon: Justin Lopez MD;  Location: [facility] OR;  Service: General;  Laterality: Left;    WOUND DEBRIDEMENT Left 9/19/2019    Procedure: DEBRIDEMENT, WOUND;  Surgeon: Justin Lopez MD;  Location: [facility] OR;  Service: General;  Laterality: Left;    WOUND DEBRIDEMENT Left 1/8/2021    Procedure: DEBRIDEMENT, WOUND left AKA STUMP;  Surgeon: Nelson Radford MD;  Location: [facility] OR;  Service: General;  Laterality: Left;     No family history on file.  Social History     Tobacco Use    Smoking status: Never Smoker    Smokeless tobacco: Never Used   Substance Use Topics    Alcohol use: Never    Drug use: Never     Review of Systems   Constitutional: Positive for fatigue.   HENT: Negative.    Respiratory: Negative.    Cardiovascular: Negative.    Gastrointestinal: Negative.    Genitourinary: Negative.    Musculoskeletal: Negative.    Skin: Positive for wound.   Neurological: Negative.    Psychiatric/Behavioral: Negative.    All other systems reviewed and are negative.      Physical Exam     Initial Vitals [06/16/22 1027]   BP Pulse Resp Temp SpO2   94/62 104 18 98.1 °F (36.7 °C) 99 %      MAP       --         Physical Exam    Nursing note and vitals reviewed.  Constitutional: Vital signs are normal.   Frail and cachetic   HENT:   Head: Normocephalic and atraumatic.   Eyes: Conjunctivae and lids are normal.   Neck: Trachea normal. Neck supple.   Cardiovascular: Regular rhythm, normal heart sounds, intact distal pulses and normal pulses.   No murmur heard.  Pulmonary/Chest: Breath sounds normal. No respiratory distress.   Abdominal: Abdomen is soft. Bowel sounds are normal.   Musculoskeletal:         General: Normal range of motion.      Cervical back: Neck supple.     Neurological: She is alert and oriented to person, place, and time. She has normal strength. No cranial nerve deficit or sensory deficit.   Skin: Capillary refill takes 2 to 3 seconds.   Diabetic ulcer presents on right foot medial aspect  with granulation tissue and is surrounding cellulitic.  No crepitus   Psychiatric: She has a normal mood and affect. Her speech is normal. Thought content normal.         ED Course   Critical Care    Date/Time: 6/16/2022 1:31 PM  Performed by: Yonis Jade MD  Authorized by: Yonis Jade MD   Direct patient critical care time: 10 minutes  Additional history critical care time: 5 minutes  Ordering / reviewing critical care time: 5 minutes  Documentation critical care time: 5 minutes  Other critical care time: 5 minutes  Total critical care time (exclusive of procedural time) : 30 minutes  Critical care time was exclusive of separately billable procedures and treating other patients.  Critical care was necessary to treat or prevent imminent or life-threatening deterioration of the following conditions: circulatory failure and sepsis.  Critical care was time spent personally by me on the following activities: evaluation of patient's response to treatment, ordering and performing treatments and interventions, examination of patient, obtaining history from patient or surrogate, ordering and review of laboratory studies, ordering and review of radiographic studies, re-evaluation of patient's condition and pulse oximetry.        Labs Reviewed   CBC W/ AUTO DIFFERENTIAL - Abnormal; Notable for the following components:       Result Value    RBC 3.44 (*)     Hemoglobin 9.0 (*)     Hematocrit 29.3 (*)     MCH 26.2 (*)     MCHC 30.7 (*)     MPV 13.4 (*)     Lymph # 0.9 (*)     Gran % 81.1 (*)     Lymph % 10.6 (*)     All other components within normal limits   COMPREHENSIVE METABOLIC PANEL - Abnormal; Notable for the following components:    Glucose 161 (*)     Total Protein 9.1 (*)     Albumin 2.9 (*)     ALT 9 (*)     Anion Gap 5 (*)     All other components within normal limits   C-REACTIVE PROTEIN - Abnormal; Notable for the following components:    CRP 8.17 (*)     All other components within normal limits   CULTURE,  BLOOD   CULTURE, BLOOD   LACTIC ACID, PLASMA   PROTIME-INR   APTT   LACTIC ACID, PLASMA   SEDIMENTATION RATE   SARS-COV-2 RDRP GENE   POCT GLUCOSE MONITORING CONTINUOUS          Imaging Results          X-Ray Foot Complete Right (Final result)  Result time 06/16/22 12:24:16    Final result by Shankar Renteria MD (06/16/22 12:24:16)                 Impression:      1. Osteomyelitis involving the right 1st metatarsal and right 1st proximal phalanx.  2. A suspected oblique nondisplaced pathologic fracture at the mid diaphysis of the right 1st metatarsal, identified on one view only.      Electronically signed by: Shankar Renteria MD  Date:    06/16/2022  Time:    12:24             Narrative:    EXAMINATION:  XR FOOT COMPLETE 3 VIEW RIGHT    CLINICAL HISTORY:  Type 2 diabetes mellitus with foot ulcer    COMPARISON:  None    FINDINGS:  Right foot radiographs, three views, demonstrate erosion of the right 1st metatarsal head and neck.  Suspected oblique nondisplaced pathologic fracture mid diaphysis of the right 1st metatarsal with adjacent erosion.  Erosion of the medial proximal aspect of the right 1st proximal phalanx.  There is adjacent soft tissue swelling with soft tissue gas.  Diffuse osteopenia.                                 Medications   lactated ringers bolus 1,000 mL (has no administration in time range)   vancomycin 1.5 g in dextrose 5 % 250 mL IVPB (ready to mix) (1,500 mg Intravenous New Bag 6/16/22 1229)   sodium chloride 0.9% bolus 1,000 mL (1,000 mLs Intravenous New Bag 6/16/22 1122)   piperacillin-tazobactam 4.5 g in dextrose 5 % 100 mL IVPB (ready to mix system) (0 g Intravenous Stopped 6/16/22 1228)     Medical Decision Making:   Initial Assessment:   51-year-old female with history of CVA with left-sided hemiparesis, hypertension, high cholesterol, diabetes presents for evaluation of diabetic ulcer noted on right foot for the past 3 weeks progressively gotten worse.  Tachycardic mildly  hypertensive.  Will get labs and imaging to rule out sepsis, osteomyelitis, gas gangrene.  Antibiotics.  Review  Clinical Tests:   Lab Tests: Ordered and Reviewed  The following lab test(s) were unremarkable: CBC and CMP  Radiological Study: Ordered and Reviewed                      Clinical Impression:   Final diagnoses:  [Z01.818] Preop examination  [E11.621, L97.519] Diabetic ulcer of right foot  [M86.171] Other acute osteomyelitis of right foot (Primary)  [R73.9] Hyperglycemia          ED Disposition Condition    Admit               Yonis Jade MD  06/16/22 2203

## 2022-06-16 NOTE — ED NOTES
Patient caretaker/sister at bedside. Reports patient PCP is Tanvi Kim. Diabetic foot ulcer to right foot x 3 weeks. Sister reports had been having nursing home care do wound care using betadine at home. Was told to come to ED for further wound care. See media for picture.

## 2022-06-16 NOTE — PROGRESS NOTES
Pharmacokinetic Initial Assessment: IV Vancomycin    Assessment/Plan:    Initiate intravenous vancomycin with loading dose of 1500 mg once followed by a maintenance dose of vancomycin 750mg IV every 12 hours  Desired empiric serum trough concentration is 15 to 20 mcg/mL  Draw vancomycin trough level 60 min prior to fourth dose on 06/17 at approximately 2200  Pharmacy will continue to follow and monitor vancomycin.      Please contact pharmacy at extension [phone] with any questions regarding this assessment.     Thank you for the consult,   Aleta Hamlin       Patient brief summary:  Pauly Sharma is a 51 y.o. female initiated on antimicrobial therapy with IV Vancomycin for treatment of suspected skin & soft tissue infection    Drug Allergies:   Review of patient's allergies indicates:  No Known Allergies    Actual Body Weight:   56.7 kg    Renal Function:   Estimated Creatinine Clearance: 76.7 mL/min (based on SCr of 0.7 mg/dL).,     Dialysis Method (if applicable):  N/A    CBC (last 72 hours):  Recent Labs   Lab Result Units 06/16/22  1058   WBC K/uL 7.99   Hemoglobin g/dL 9.0*   Hematocrit % 29.3*   Platelets K/uL 152   Gran % % 81.1*   Lymph % % 10.6*   Mono % % 4.5   Eosinophil % % 3.1   Basophil % % 0.6   Differential Method  Automated       Metabolic Panel (last 72 hours):  Recent Labs   Lab Result Units 06/16/22  1058   Sodium mmol/L 136   Potassium mmol/L 4.1   Chloride mmol/L 106   CO2 mmol/L 25   Glucose mg/dL 161*   BUN mg/dL 19   Creatinine mg/dL 0.7   Albumin g/dL 2.9*   Total Bilirubin mg/dL 0.3   Alkaline Phosphatase U/L 87   AST U/L 11   ALT U/L 9*       Drug levels (last 3 results):  No results for input(s): VANCOMYCINRA, VANCORANDOM, VANCOMYCINPE, VANCOPEAK, VANCOMYCINTR, VANCOTROUGH in the last 72 hours.    Microbiologic Results:  Microbiology Results (last 7 days)       Procedure Component Value Units Date/Time    Blood culture x two cultures. Draw prior to antibiotics. [[id number]]  Collected: 06/16/22 1120    Order Status: Sent Specimen: Blood Updated: 06/16/22 1120    Blood culture x two cultures. Draw prior to antibiotics. [[id number]] Collected: 06/16/22 1059    Order Status: Sent Specimen: Blood Updated: 06/16/22 1059

## 2022-06-17 PROBLEM — M86.9 PYOGENIC INFLAMMATION OF BONE: Status: ACTIVE | Noted: 2022-01-01

## 2022-06-17 PROBLEM — M86.9 OSTEOMYELITIS OF RIGHT FOOT: Status: ACTIVE | Noted: 2022-01-01

## 2022-06-17 PROBLEM — R65.20 SEVERE SEPSIS: Status: ACTIVE | Noted: 2022-01-01

## 2022-06-17 PROBLEM — A41.9 SEVERE SEPSIS: Status: ACTIVE | Noted: 2022-01-01

## 2022-06-17 PROBLEM — E83.42 HYPOMAGNESEMIA: Status: ACTIVE | Noted: 2022-01-01

## 2022-06-17 PROBLEM — L97.519 DIABETIC ULCER OF RIGHT FOOT: Status: ACTIVE | Noted: 2019-10-03

## 2022-06-17 PROBLEM — B86 SCABIES: Status: ACTIVE | Noted: 2022-01-01

## 2022-06-17 NOTE — ASSESSMENT & PLAN NOTE
Podiatry consult - cont iv abxs    X-Ray Foot Complete Right [[id number]] Resulted: 06/16/22 1224   Order Status: Completed Updated: 06/16/22 1226   Narrative:     EXAMINATION:   XR FOOT COMPLETE 3 VIEW RIGHT     CLINICAL HISTORY:   Type 2 diabetes mellitus with foot ulcer     COMPARISON:   None     FINDINGS:   Right foot radiographs, three views, demonstrate erosion of the right 1st metatarsal head and neck.  Suspected oblique nondisplaced pathologic fracture mid diaphysis of the right 1st metatarsal with adjacent erosion.  Erosion of the medial proximal aspect of the right 1st proximal phalanx.  There is adjacent soft tissue swelling with soft tissue gas.  Diffuse osteopenia.    Impression:       1. Osteomyelitis involving the right 1st metatarsal and right 1st proximal phalanx.   2. A suspected oblique nondisplaced pathologic fracture at the mid diaphysis of the right 1st metatarsal, identified on one view only.       Electronically signed by: Shankar Renteria MD   Date: 06/16/2022   Time: 12:24

## 2022-06-17 NOTE — EICU
Rounding (Video Assessment):  Yes    Comments: Video assessment completed.  Pt lying in bed with eyes closed.  Resp even and unlabored.  Contracture noted to (L) UE.  Levophed infusion in progress at 0.08 mcg/kg/min in progress. NAD noted at this time.

## 2022-06-17 NOTE — PLAN OF CARE
[place] - Intensive Care  Initial Discharge Assessment       Primary Care Provider: CHEN Fournier    Admission Diagnosis: Preop examination [Z01.818]  Hyperglycemia [R73.9]  Diabetic ulcer of right foot [E11.621, L97.519]  Other acute osteomyelitis of right foot [M86.171]    Admission Date: 6/16/2022  Expected Discharge Date:     Discharge Barriers Identified: Other (see comments) (APS was notified.)    Payor: MEDICAID / [facility]: [place] [facility] / Product Type: Managed Medicaid /     Extended Emergency Contact Information  Primary Emergency Contact: Mariann Sharma  Mobile Phone: [phone]  Relation: Sister  Preferred language: English   needed? No    Discharge Plan A: Skilled Nursing Facility  Discharge Plan B: Skilled Nursing Facility      [facility] - [place], [place] - [address]  [facility] [address]  Phone: [phone] Fax: [phone]      Initial Assessment (most recent)     Adult Discharge Assessment - 06/17/22 1210        Discharge Assessment    Assessment Type Discharge Planning Assessment     Confirmed/corrected address, phone number and insurance Yes     Confirmed Demographics Correct on Facesheet     Source of Information family     When was your last doctors appointment? --   The patient's sister stated that her last appointment was yesterday.    Reason For Admission Severe sepsis     Lives With sibling(s);other relative(s);other (see comments)   The patient lives with her sister, niece, nephew, and sister's significant other.    Do you expect to return to your current living situation? No     Do you have help at home or someone to help you manage your care at home? Yes     Who are your caregiver(s) and their phone number(s)? Mariann (Sister)   [phone]     Prior to hospitilization cognitive status: Unable to Assess     Current cognitive status: Unable to Assess     Walking or Climbing Stairs Difficulty ambulation difficulty,  dependent;transferring difficulty, requires equipment;transferring difficulty, dependent;ambulation difficulty, requires equipment     Mobility Management wheelchair and lift device     Dressing/Bathing Difficulty bathing difficulty, dependent;dressing difficulty, dependent     Home Accessibility wheelchair accessible     Home Layout Able to live on 1st floor     Equipment Currently Used at Home wheelchair;lift device;hospital bed     Readmission within 30 days? No     Patient currently being followed by outpatient case management? No     Do you currently have service(s) that help you manage your care at home? No     Do you take prescription medications? Yes     Do you have prescription coverage? Yes     Do you have any problems affording any of your prescribed medications? No     Is the patient taking medications as prescribed? yes     Who is going to help you get home at discharge? --   TBD    How do you get to doctors appointments? health plan transportation;family or friend will provide     Discharge Plan A Skilled Nursing Facility     Discharge Plan B Skilled Nursing Facility     DME Needed Upon Discharge  other (see comments)   TBD    Discharge Plan discussed with: Sibling     Name(s) and Number(s) Mariann (Sister)   [phone]     Discharge Barriers Identified Other (see comments)   APS was notified.            Initial discharge assessment is completed. Unable to assess the patient in the ICU. Assessment was completed with the patient's sister/caregiver. According to the patient's sister, Mariann, the patient is total care.  She requires total assistance with her ADLs. I spoke to the patient's sister regarding the concerns I had pertaining to the patient's care. I informed the patient's sister that APS would be notified. She verbalized understanding. Mariann and I discussed the patient's discharge plan of care, and she was okay with nursing home placement. She agreed to [facility]. Physician was notified.

## 2022-06-17 NOTE — H&P
[facility] Medicine  History & Physical    Patient Name: Pauly Sharma  MRN: [id number]  Patient Class: IP- Inpatient  Admission Date: 6/16/2022  Attending Physician: Zenaida Pastrana MD   Primary Care Provider: CHEN Fournier         Patient information was obtained from ER records.     Subjective:     Principal Problem:Severe sepsis    Chief Complaint:   Chief Complaint   Patient presents with    Diabetic Foot Ulcer     Sent by PCP for foot ulcer to right foot x 3 weeks.         HPI: 51-year-old female with history of CVA with left-sided hemiparesis, hypertension, high cholesterol, diabetes, left AKA presents for evaluation of diabetic ulcer noted on right foot for the past 3 weeks progressively gotten worse.  Patient was at PCP earlier today and was instructed to come to the emergency department for evaluation.  Patient denies any vomiting, fever, chills.  Not on any antibiotics has follow-up with podiatrist on Tuesday  Pt also with multiple scratches all over body from itching - has multiple small bugs crawling all over  Overnight, had to be trasferred to ivu for low blood pressure - placed on levophed      Past Medical History:   Diagnosis Date    CVA (cerebral vascular accident) 02/2003    Diabetes mellitus     Gangrene 09/12/2019    l 5th toe    Hemiparesis     LEFT SIDE    Hyperlipidemia     Hypertension     Lice     6 weeks ago- family states she was treated for it    Multiple sclerosis     PVD (peripheral vascular disease)     Venous insufficiency        Past Surgical History:   Procedure Laterality Date    ABOVE-KNEE AMPUTATION Left 10/4/2019    Procedure: AMPUTATION, ABOVE KNEE left leg;  Surgeon: Justin Lopez MD;  Location: [facility] OR;  Service: General;  Laterality: Left;    ANGIOGRAPHY OF LOWER EXTREMITY Left 9/16/2019    Procedure: Angiogram Extremity Unilateral;  Surgeon: Berhane Colindres MD;  Location: [facility] CATH;  Service: Cardiology;   Laterality: Left;    DISARTICULATION OF HIP Left 1/13/2021    Procedure: DISARTICULATION, HIP;  Surgeon: THOM Whiteside II, MD;  Location: [facility] OR;  Service: Orthopedics;  Laterality: Left;  leg stump open and draining     PERCUTANEOUS TRANSLUMINAL ANGIOPLASTY (PTA) OF PERIPHERAL VESSEL N/A 9/13/2019    Procedure: PTA, PERIPHERAL VESSEL;  Surgeon: Berhane Colindres MD;  Location: [facility] CATH;  Service: Cardiology;  Laterality: N/A;    TOE AMPUTATION Left 9/19/2019    Procedure: AMPUTATION, TOE;  Surgeon: Justin Lopez MD;  Location: [facility] OR;  Service: General;  Laterality: Left;    WOUND DEBRIDEMENT Left 9/19/2019    Procedure: DEBRIDEMENT, WOUND;  Surgeon: Justin Lopez MD;  Location: [facility] OR;  Service: General;  Laterality: Left;    WOUND DEBRIDEMENT Left 1/8/2021    Procedure: DEBRIDEMENT, WOUND left AKA STUMP;  Surgeon: Nelson Radford MD;  Location: [facility] OR;  Service: General;  Laterality: Left;       Review of patient's allergies indicates:  No Known Allergies    No current facility-administered medications on file prior to encounter.     Current Outpatient Medications on File Prior to Encounter   Medication Sig    ferrous sulfate (FEOSOL) 325 mg (65 mg iron) Tab tablet Take 1 tablet (325 mg total) by mouth once daily.    gabapentin (NEURONTIN) 600 MG tablet Take 600 mg by mouth 3 (three) times daily.    linaCLOtide (LINZESS) 145 mcg Cap capsule Take 145 mcg by mouth before breakfast.    lisinopril (PRINIVIL,ZESTRIL) 2.5 MG tablet Take 2.5 mg by mouth every evening.     metFORMIN (GLUCOPHAGE) 1000 MG tablet Take 500 mg by mouth 2 (two) times daily.     pravastatin (PRAVACHOL) 40 MG tablet Take 40 mg by mouth every evening.    insulin glargine (LANTUS U-100 INSULIN) 100 unit/mL injection Inject 30 Units into the skin every evening.      Family History       Problem Relation (Age of Onset)    Diabetes Mother    Heart disease Mother          Tobacco Use    Smoking status: Never Smoker     Smokeless tobacco: Never Used   Substance and Sexual Activity    Alcohol use: Never    Drug use: Never    Sexual activity: Not on file     Review of Systems   Unable to perform ROS: Acuity of condition   Skin:  Positive for rash.   Objective:     Vital Signs (Most Recent):  Temp: 98.2 °F (36.8 °C) (06/17/22 0400)  Pulse: (!) 123 (06/17/22 0505)  Resp: (!) 33 (06/17/22 0505)  BP: (!) 95/52 (06/17/22 0505)  SpO2: 96 % (06/17/22 0505)   Vital Signs (24h Range):  Temp:  [96.1 °F (35.6 °C)-98.5 °F (36.9 °C)] 98.2 °F (36.8 °C)  Pulse:  [] 123  Resp:  [14-52] 33  SpO2:  [82 %-100 %] 96 %  BP: ()/(30-80) 95/52     Weight: 56.7 kg (125 lb)  Body mass index is 27.05 kg/m².    Physical Exam  Constitutional:       Comments: Thin female; squirming around in bed   HENT:      Mouth/Throat:      Mouth: Mucous membranes are moist.   Eyes:      Extraocular Movements: Extraocular movements intact.      Pupils: Pupils are equal, round, and reactive to light.   Cardiovascular:      Rate and Rhythm: Normal rate and regular rhythm.   Pulmonary:      Effort: Pulmonary effort is normal.      Breath sounds: Normal breath sounds.   Abdominal:      General: Abdomen is flat.      Palpations: Abdomen is soft.   Skin:     Comments: Multiple scratches all over; small bugs noted in hair; right foot 5cm eschar on lateral side of right foot at 1st mtp joint with some slight drainage noted ; left aka noted        CRANIAL NERVES     CN III, IV, VI   Pupils are equal, round, and reactive to light.     Significant Labs: All pertinent labs within the past 24 hours have been reviewed.  Recent Lab Results  (Last 5 results in the past 24 hours)        06/17/22  0335   06/16/22  1951   06/16/22  1524   06/16/22  1405   06/16/22  1120        Albumin 1.9               Alkaline Phosphatase 65               ALT 9               Anion Gap 10               AST 24               Baso # 0.07               Basophil % 0.4               BILIRUBIN TOTAL  0.4  Comment: For infants and newborns, interpretation of results should be based  on gestational age, weight and in agreement with clinical  observations.    Premature Infant recommended reference ranges:  Up to 24 hours.............<8.0 mg/dL  Up to 48 hours............<12.0 mg/dL  3-5 days..................<15.0 mg/dL  6-29 days.................<15.0 mg/dL    For patients on Eltrombopag therapy, use of Dimension [place] TBIL is   not   recommended.                 Blood Culture, Routine         Gram stain aer bottle: Gram positive cocci in clusters resembling Staph   [P]                Results called to and read back by: Brit Anthony RN  06/17/2022  06:33  [P]       BUN 15               Calcium 7.5               Chloride 111               CO2 18               Creatinine 0.9               Differential Method Automated               eGFR if  >60.0               eGFR if non  >60.0  Comment: Calculation used to obtain the estimated glomerular filtration  rate (eGFR) is the CKD-EPI equation.                  Eos # 0.0               Eosinophil % 0.1               Estimated Avg Glucose 148               Glucose 355               Gran # (ANC) 15.5               Gran % 93.1               Hematocrit 25.1               Hemoglobin 7.6               Hemoglobin A1C External 6.8  Comment: ADA Screening Guidelines:  5.7-6.4%  Consistent with prediabetes  >or=6.5%  Consistent with diabetes    High levels of fetal hemoglobin interfere with the HbA1C  assay. Heterozygous hemoglobin variants (HbS, HgC, etc)do  not significantly interfere with this assay.   However, presence of multiple variants may affect accuracy.                 Immature Grans (Abs) 0.11  Comment: Mild elevation in immature granulocytes is non specific and   can be seen in a variety of conditions including stress response,   acute inflammation, trauma and pregnancy. Correlation with other   laboratory and clinical findings is  "essential.                 Immature Granulocytes 0.7               Lactate, Christophe     2.3           Lymph # 0.6               Lymph % 3.5               Magnesium 0.9  Comment: MG    critical result(s) called and verbal readback obtained from   Brit by LEONARDO 06/17/2022 04:26                 MCH 26.4               MCHC 30.3               MCV 87               Mono # 0.4               Mono % 2.2               MPV 13.4               nRBC 0               Platelets 211               POCT Glucose   173             Potassium 3.9               PROTEIN TOTAL 6.4                Acceptable       Yes         RBC 2.88               RDW 13.2               SARS-CoV-2 RNA, Amplification, Qual       Negative         Sodium 139               WBC 16.65                                       [P] - Preliminary Result               Significant Imaging: I have reviewed all pertinent imaging results/findings within the past 24 hours.    Assessment/Plan:     * Severe sepsis  This patient does have evidence of infective focus  My overall impression is septic shock.  Source: rigth foot osteomyelitis  Antibiotics given-   Antibiotics (From admission, onward)            Start     Stop Route Frequency Ordered    06/17/22 0000  piperacillin-tazobactam 4.5 g in dextrose 5 % 100 mL IVPB (ready to mix system)         -- IV Every 8 hours (non-standard times) 06/16/22 2028 06/16/22 2300  vancomycin 750 mg in dextrose 5 % 250 mL IVPB (ready to mix system)         -- IV Every 12 hours (non-standard times) 06/16/22 1554    06/16/22 1627  vancomycin - pharmacy to dose  (vancomycin IVPB)        "And" Linked Group Details    -- IV pharmacy to manage frequency 06/16/22 1533        Latest lactate reviewed-  Recent Labs   Lab 06/16/22  1058 06/16/22  1524   LACTATE 2.2 2.3*     Organ dysfunction indicated by encephalopathy    Shock with decreased perfusion noted, Fluid challenge  was given at 30cc/kg and was not given at 30cc/kg due to initally " didnt seem to have sepsis - once bp decreased - pt was started on more aggressive ivfs    Post- resuscitation assessment- (Done after fluids given for shock)  Vital signs post fluid administrations were-  Temp Readings from Last 1 Encounters:   06/17/22 98.2 °F (36.8 °C)     BP Readings from Last 1 Encounters:   06/17/22 (!) 95/52     Pulse Readings from Last 1 Encounters:   06/17/22 (!) 123       Perfusion exam was performed within 6 hours of septic shock presentation after bolus shows Inadequate tissue perfusion assessed by non-invasive monitoring  Will Start Pressors- Levophed for MAP of 65  Source control achieved by: iv abxs          Scabies  permethrin cream      Hypomagnesemia  4g mag sulfate       Pyogenic inflammation of bone  Podiatry consult - cont iv abxs    X-Ray Foot Complete Right [[id number]] Resulted: 06/16/22 1224   Order Status: Completed Updated: 06/16/22 1226   Narrative:     EXAMINATION:   XR FOOT COMPLETE 3 VIEW RIGHT     CLINICAL HISTORY:   Type 2 diabetes mellitus with foot ulcer     COMPARISON:   None     FINDINGS:   Right foot radiographs, three views, demonstrate erosion of the right 1st metatarsal head and neck.  Suspected oblique nondisplaced pathologic fracture mid diaphysis of the right 1st metatarsal with adjacent erosion.  Erosion of the medial proximal aspect of the right 1st proximal phalanx.  There is adjacent soft tissue swelling with soft tissue gas.  Diffuse osteopenia.    Impression:       1. Osteomyelitis involving the right 1st metatarsal and right 1st proximal phalanx.   2. A suspected oblique nondisplaced pathologic fracture at the mid diaphysis of the right 1st metatarsal, identified on one view only.       Electronically signed by: Shankar Renteria MD   Date: 06/16/2022   Time: 12:24         Lice  Had treatment at home - will apply vasoline to head to suffocate the bugs and may need to reapply treatment after that      Diabetic ulcer of right foot  Podiatry consult - on  iv abxs  Treat dm    Anemia  Monitor h/h - repeat labs at 2 pm      PVD (peripheral vascular disease)  lovenox at dvt dosing  Will restart statin once able to toelrate po      DM type 2 (diabetes mellitus, type 2)  Currently npor due to mental status - accuchecks q 6 hrs with ssi      VTE Risk Mitigation (From admission, onward)         Ordered     IP VTE HIGH RISK PATIENT  Once         06/16/22 2020     Place sequential compression device  Until discontinued         06/16/22 2020                   Zenaida Pastrana MD  Department of Hospital Medicine   [place] - Intensive [facility]

## 2022-06-17 NOTE — EICU
Rounding (Video Assessment):  Yes    Intervention Initiated From:  [facility] / EICU    Comments: Video rounds complete. Patient resting in bed with BiPAP in place. No alarms, or distress noted at this time.

## 2022-06-17 NOTE — ASSESSMENT & PLAN NOTE
Had treatment at home - will apply vasoline to head to suffocate the bugs and may need to reapply treatment after that

## 2022-06-17 NOTE — EICU
EICU BRIEF ADMIT NOTE:    Reason for ICU admission:  Shock    Please refer to admission H&P for details.     Currently on defect.    Chart reviewed: yes  Recent MD notes reviewed: Yes  Labs results reviewed: yes  Radiology: Chest images reviewed. Reports for others reviewed.  Telemetry tracing: yes  Communicated issues/orders/plan with: bedside ICU RN    Best Practices Review:  Stress ulcer prophylaxis: H2 blocker. PPI not indicated  DVT prophylaxis: Pharmacological .   Blood glucose monitoring: Ordered        Ventilator review:  Intubated : No      Assessment & Plan:     Impression:  Shock hypovolemia versus septic shock  Diabetic foot ulcer with infection  Osteomyelitis of the right 1st metatarsal and 1st proximal phalanx  Uncontrolled diabetes mellitus  Mild lactic acidosis  Anemia    Plan:  Continue antibiotics with Zosyn and vancomycin.  Follow-up cultures.  Check procalcitonin level.  Deescalate antibiotics based on further clinical data.  Podiatry consult.  I will get more fluids with 1 L of Ringer's lactate as bolus.  Continue fluid resuscitation.  Wean Levophed if tolerated.  Continue to monitor hemodynamics closely.  Insulin sliding scale.  Continue rest of supportive care.      Thank you for allowing the EICU to participate in your patient's care. Please feel free to call us as needed.     I have encourage bedside RN to call me with the results of labs/imaging if ordered.         Dre Adams MD  [facility]  [phone]

## 2022-06-17 NOTE — NURSING
"0800 dr pastrana ordered to apply vaseline on pts scalp.   0830 infection control notified nursing staff to limit the visitiors to avoid exposure of the bed bugs, scabies, and lice.   0838 dr pastrana notified of zero urine occurrences overnight and this am. She ordered to place carrion in patient.   1011 notified dr pastrana of cloudy urine when carrion placed and she ordered a UA with culture  1030 sister called to get an update on patient, she was notified of levo needed to help with blood pressure and limited visitation to attempt to contain exposure of the bugs.   1057 notified dr pastrana of pt low blood pressure and clarified order parameters. She instructed nurse to keep map >60 as ordered. She also ordered to change concentration of levo to 8mg to avoid a central line being placed.   1203 respiratory team called to notify of patients increased WOB and to ask for an assessment from their team with recommendations. They notified me that they" needed orders to change interventions from Dr pastrana and they would round on patient again before their shift ended".   1219 notified Dr Pastrana on increased work of breathing she ordered an ABG and to call anesthesia for an ART line.  1220 no anesthesia available for ART line dr pastrana notified  1248  Dr Pastrana notified of ABG results.  1255 pt placed on a nonrebreather mask at 15L 100% O2  1344 Dr Pastrana notified of access needed to administer new fluids with bicarb, as they are incompatible with levophed. Pt is a hard stick with one access.   1354 Dr Pastrana notified nursing staff of her consult to surgery for central line placement and they would come by after they finished a procedure.  1543 Sister called to get telephone consent for placemen of CVC, sister updated.  1552 Dr sesay at bedside placing a subclavicular CVC.  1555 pt did not tolerate procedure desating and increased work of breathing. Pt sat up after procedure.   1644 XRAY taken placement of line and okay " to use per Dr sesay.  1645 Dr pastrana notified of desat after pt laying for procedure she ordered BIPAP to be placed on patient respiratory team notified  1715 Dr Pastrana notified of XRAY results and impression stated per radiologist, she ordered lasix to be given  1716 dr pastrana notified of respiratory concern of placing bipap on pt with vaseline being flammable. She ordered to keep vaseline overnight and wash off the permetrin tonight. No new orders will continue to monitor.   1833 nurse clarified with dr pastrana if a follow ABG was needed. Dr pastrana placed orders.

## 2022-06-17 NOTE — ASSESSMENT & PLAN NOTE
Fluctuant/boggy eschar debrided at the bedside using scissor and forceps.  Bone exposed under eschar.  Deep wound culture of ulceration taken.  Area cleansed.  Betadine dressing applied.  We'll have nurses continue with Betadine dressing the area daily.

## 2022-06-17 NOTE — NURSING
Spoke to Dr. Haney per telephone about pt. Low blood pressures after receiving 1 liter bolus, new order given to send to ICU and put on Levafed, titrate as need to keep mean above 60. Readback orders

## 2022-06-17 NOTE — ASSESSMENT & PLAN NOTE
"This patient does have evidence of infective focus  My overall impression is septic shock.  Source: rigth foot osteomyelitis  Antibiotics given-   Antibiotics (From admission, onward)            Start     Stop Route Frequency Ordered    06/17/22 0000  piperacillin-tazobactam 4.5 g in dextrose 5 % 100 mL IVPB (ready to mix system)         -- IV Every 8 hours (non-standard times) 06/16/22 2028 06/16/22 2300  vancomycin 750 mg in dextrose 5 % 250 mL IVPB (ready to mix system)         -- IV Every 12 hours (non-standard times) 06/16/22 1554    06/16/22 1627  vancomycin - pharmacy to dose  (vancomycin IVPB)        "And" Linked Group Details    -- IV pharmacy to manage frequency 06/16/22 1533        Latest lactate reviewed-  Recent Labs   Lab 06/16/22  1058 06/16/22  1524   LACTATE 2.2 2.3*     Organ dysfunction indicated by encephalopathy    Shock with decreased perfusion noted, Fluid challenge  was given at 30cc/kg and was not given at 30cc/kg due to initally didnt seem to have sepsis - once bp decreased - pt was started on more aggressive ivfs    Post- resuscitation assessment- (Done after fluids given for shock)  Vital signs post fluid administrations were-  Temp Readings from Last 1 Encounters:   06/17/22 98.2 °F (36.8 °C)     BP Readings from Last 1 Encounters:   06/17/22 (!) 95/52     Pulse Readings from Last 1 Encounters:   06/17/22 (!) 123       Perfusion exam was performed within 6 hours of septic shock presentation after bolus shows Inadequate tissue perfusion assessed by non-invasive monitoring  Will Start Pressors- Levophed for MAP of 65  Source control achieved by: iv abxs        "

## 2022-06-17 NOTE — SUBJECTIVE & OBJECTIVE
Past Medical History:   Diagnosis Date    CVA (cerebral vascular accident) 02/2003    Diabetes mellitus     Gangrene 09/12/2019    l 5th toe    Hemiparesis     LEFT SIDE    Hyperlipidemia     Hypertension     Lice     6 weeks ago- family states she was treated for it    Multiple sclerosis     PVD (peripheral vascular disease)     Venous insufficiency        Past Surgical History:   Procedure Laterality Date    ABOVE-KNEE AMPUTATION Left 10/4/2019    Procedure: AMPUTATION, ABOVE KNEE left leg;  Surgeon: Justin Lopez MD;  Location: [facility] OR;  Service: General;  Laterality: Left;    ANGIOGRAPHY OF LOWER EXTREMITY Left 9/16/2019    Procedure: Angiogram Extremity Unilateral;  Surgeon: Berhane Colindres MD;  Location: [facility] CATH;  Service: Cardiology;  Laterality: Left;    DISARTICULATION OF HIP Left 1/13/2021    Procedure: DISARTICULATION, HIP;  Surgeon: THOM Whiteside II, MD;  Location: [facility] OR;  Service: Orthopedics;  Laterality: Left;  leg stump open and draining     PERCUTANEOUS TRANSLUMINAL ANGIOPLASTY (PTA) OF PERIPHERAL VESSEL N/A 9/13/2019    Procedure: PTA, PERIPHERAL VESSEL;  Surgeon: Berhane Colindres MD;  Location: [facility] CATH;  Service: Cardiology;  Laterality: N/A;    TOE AMPUTATION Left 9/19/2019    Procedure: AMPUTATION, TOE;  Surgeon: Justin Lopez MD;  Location: [facility] OR;  Service: General;  Laterality: Left;    WOUND DEBRIDEMENT Left 9/19/2019    Procedure: DEBRIDEMENT, WOUND;  Surgeon: Justin Lopez MD;  Location: [facility] OR;  Service: General;  Laterality: Left;    WOUND DEBRIDEMENT Left 1/8/2021    Procedure: DEBRIDEMENT, WOUND left AKA STUMP;  Surgeon: Nelson Radford MD;  Location: [facility] OR;  Service: General;  Laterality: Left;       Review of patient's allergies indicates:  No Known Allergies    No current facility-administered medications on file prior to encounter.     Current Outpatient Medications on File Prior to Encounter   Medication Sig    ferrous sulfate (FEOSOL) 325 mg (65 mg iron)  Tab tablet Take 1 tablet (325 mg total) by mouth once daily.    gabapentin (NEURONTIN) 600 MG tablet Take 600 mg by mouth 3 (three) times daily.    linaCLOtide (LINZESS) 145 mcg Cap capsule Take 145 mcg by mouth before breakfast.    lisinopril (PRINIVIL,ZESTRIL) 2.5 MG tablet Take 2.5 mg by mouth every evening.     metFORMIN (GLUCOPHAGE) 1000 MG tablet Take 500 mg by mouth 2 (two) times daily.     pravastatin (PRAVACHOL) 40 MG tablet Take 40 mg by mouth every evening.    insulin glargine (LANTUS U-100 INSULIN) 100 unit/mL injection Inject 30 Units into the skin every evening.      Family History       Problem Relation (Age of Onset)    Diabetes Mother    Heart disease Mother          Tobacco Use    Smoking status: Never Smoker    Smokeless tobacco: Never Used   Substance and Sexual Activity    Alcohol use: Never    Drug use: Never    Sexual activity: Not on file     Review of Systems   Unable to perform ROS: Acuity of condition   Skin:  Positive for rash.   Objective:     Vital Signs (Most Recent):  Temp: 98.2 °F (36.8 °C) (06/17/22 0400)  Pulse: (!) 123 (06/17/22 0505)  Resp: (!) 33 (06/17/22 0505)  BP: (!) 95/52 (06/17/22 0505)  SpO2: 96 % (06/17/22 0505)   Vital Signs (24h Range):  Temp:  [96.1 °F (35.6 °C)-98.5 °F (36.9 °C)] 98.2 °F (36.8 °C)  Pulse:  [] 123  Resp:  [14-52] 33  SpO2:  [82 %-100 %] 96 %  BP: ()/(30-80) 95/52     Weight: 56.7 kg (125 lb)  Body mass index is 27.05 kg/m².    Physical Exam  Constitutional:       Comments: Thin female; squirming around in bed   HENT:      Mouth/Throat:      Mouth: Mucous membranes are moist.   Eyes:      Extraocular Movements: Extraocular movements intact.      Pupils: Pupils are equal, round, and reactive to light.   Cardiovascular:      Rate and Rhythm: Normal rate and regular rhythm.   Pulmonary:      Effort: Pulmonary effort is normal.      Breath sounds: Normal breath sounds.   Abdominal:      General: Abdomen is flat.      Palpations: Abdomen is  soft.   Skin:     Comments: Multiple scratches all over; small bugs noted in hair; left foot 5cm eschar on lateral side of right foot at 1st mtp joint with some slight drainage noted         CRANIAL NERVES     CN III, IV, VI   Pupils are equal, round, and reactive to light.     Significant Labs: All pertinent labs within the past 24 hours have been reviewed.  Recent Lab Results  (Last 5 results in the past 24 hours)        06/17/22  0335   06/16/22  1951   06/16/22  1524   06/16/22  1405   06/16/22  1120        Albumin 1.9               Alkaline Phosphatase 65               ALT 9               Anion Gap 10               AST 24               Baso # 0.07               Basophil % 0.4               BILIRUBIN TOTAL 0.4  Comment: For infants and newborns, interpretation of results should be based  on gestational age, weight and in agreement with clinical  observations.    Premature Infant recommended reference ranges:  Up to 24 hours.............<8.0 mg/dL  Up to 48 hours............<12.0 mg/dL  3-5 days..................<15.0 mg/dL  6-29 days.................<15.0 mg/dL    For patients on Eltrombopag therapy, use of Dimension [place] TBIL is   not   recommended.                 Blood Culture, Routine         Gram stain aer bottle: Gram positive cocci in clusters resembling Staph   [P]                Results called to and read back by: Brit Anthony RN  06/17/2022  06:33  [P]       BUN 15               Calcium 7.5               Chloride 111               CO2 18               Creatinine 0.9               Differential Method Automated               eGFR if  >60.0               eGFR if non  >60.0  Comment: Calculation used to obtain the estimated glomerular filtration  rate (eGFR) is the CKD-EPI equation.                  Eos # 0.0               Eosinophil % 0.1               Estimated Avg Glucose 148               Glucose 355               Gran # (ANC) 15.5               Gran % 93.1                Hematocrit 25.1               Hemoglobin 7.6               Hemoglobin A1C External 6.8  Comment: ADA Screening Guidelines:  5.7-6.4%  Consistent with prediabetes  >or=6.5%  Consistent with diabetes    High levels of fetal hemoglobin interfere with the HbA1C  assay. Heterozygous hemoglobin variants (HbS, HgC, etc)do  not significantly interfere with this assay.   However, presence of multiple variants may affect accuracy.                 Immature Grans (Abs) 0.11  Comment: Mild elevation in immature granulocytes is non specific and   can be seen in a variety of conditions including stress response,   acute inflammation, trauma and pregnancy. Correlation with other   laboratory and clinical findings is essential.                 Immature Granulocytes 0.7               Lactate, Christophe     2.3           Lymph # 0.6               Lymph % 3.5               Magnesium 0.9  Comment: MG    critical result(s) called and verbal readback obtained from   Brit by HATJyoti 06/17/2022 04:26                 MCH 26.4               MCHC 30.3               MCV 87               Mono # 0.4               Mono % 2.2               MPV 13.4               nRBC 0               Platelets 211               POCT Glucose   173             Potassium 3.9               PROTEIN TOTAL 6.4                Acceptable       Yes         RBC 2.88               RDW 13.2               SARS-CoV-2 RNA, Amplification, Qual       Negative         Sodium 139               WBC 16.65                                       [P] - Preliminary Result               Significant Imaging: I have reviewed all pertinent imaging results/findings within the past 24 hours.

## 2022-06-17 NOTE — CONSULTS
[place] - Intensive Care  Podiatry  Consult Note    Patient Name: Pauly Sharma  MRN: [id number]  Admission Date: 6/16/2022  Hospital Length of Stay: 1 days  Attending Physician: Zenaida Pastrana MD  Primary Care Provider: CHEN Fournier     Consults  Subjective:     History of Present Illness: Patient admitted to [facility] for sepsis secondary to diabetic foot ulcer/infection.  Patient is somewhat known to myself.  She has a history of AKA due to similar issues on the left lower extremity.  I last saw her towards the end of last year.  Had significant peripheral vascular disease, needed revascularization by [facility].  Patient has not followed up since the end of last year.  I was consult for evaluation and management of diabetic ulceration.  X-rays taken in the ER show bony changes to the first ray consistent with osteomyelitis.  Patient is agitated in bed, due to the bed bugs/scabies/lice infection.  She is in restraints so she does not itch herself.  No dressing intact to the right foot.  No notes on file    Scheduled Meds:   enoxaparin  40 mg Subcutaneous Daily    mupirocin   Nasal BID    piperacillin-tazobactam (ZOSYN) IVPB  4.5 g Intravenous Q8H    vancomycin (VANCOCIN) IVPB  750 mg Intravenous Q12H     Continuous Infusions:   sodium chloride 0.9% 125 mL/hr at 06/17/22 0733    NORepinephrine bitartrate-D5W 0.14 mcg/kg/min (06/17/22 1215)     PRN Meds:acetaminophen, acetaminophen, dextrose 10%, dextrose 10%, diphenhydrAMINE, glucagon (human recombinant), glucose, glucose, insulin aspart U-100, melatonin, ondansetron, sodium chloride 0.9%, Pharmacy to dose Vancomycin consult **AND** vancomycin - pharmacy to dose    Review of patient's allergies indicates:  No Known Allergies     Past Medical History:   Diagnosis Date    CVA (cerebral vascular accident) 02/2003    Diabetes mellitus     Gangrene 09/12/2019    l 5th toe    Hemiparesis     LEFT SIDE    Hyperlipidemia     Hypertension     Lice     6  weeks ago- family states she was treated for it    Multiple sclerosis     PVD (peripheral vascular disease)     Venous insufficiency      Past Surgical History:   Procedure Laterality Date    ABOVE-KNEE AMPUTATION Left 10/4/2019    Procedure: AMPUTATION, ABOVE KNEE left leg;  Surgeon: Justin Lopez MD;  Location: [facility] OR;  Service: General;  Laterality: Left;    ANGIOGRAPHY OF LOWER EXTREMITY Left 9/16/2019    Procedure: Angiogram Extremity Unilateral;  Surgeon: Berhane Colindres MD;  Location: [facility] CATH;  Service: Cardiology;  Laterality: Left;    DISARTICULATION OF HIP Left 1/13/2021    Procedure: DISARTICULATION, HIP;  Surgeon: THOM Whiteside II, MD;  Location: [facility] OR;  Service: Orthopedics;  Laterality: Left;  leg stump open and draining     PERCUTANEOUS TRANSLUMINAL ANGIOPLASTY (PTA) OF PERIPHERAL VESSEL N/A 9/13/2019    Procedure: PTA, PERIPHERAL VESSEL;  Surgeon: Berhane Colindres MD;  Location: [facility] CATH;  Service: Cardiology;  Laterality: N/A;    TOE AMPUTATION Left 9/19/2019    Procedure: AMPUTATION, TOE;  Surgeon: Justni Lopez MD;  Location: [facility] OR;  Service: General;  Laterality: Left;    WOUND DEBRIDEMENT Left 9/19/2019    Procedure: DEBRIDEMENT, WOUND;  Surgeon: Justin Lopez MD;  Location: [facility] OR;  Service: General;  Laterality: Left;    WOUND DEBRIDEMENT Left 1/8/2021    Procedure: DEBRIDEMENT, WOUND left AKA STUMP;  Surgeon: Nelson Radford MD;  Location: [facility] OR;  Service: General;  Laterality: Left;       Family History       Problem Relation (Age of Onset)    Diabetes Mother    Heart disease Mother          Tobacco Use    Smoking status: Never Smoker    Smokeless tobacco: Never Used   Substance and Sexual Activity    Alcohol use: Never    Drug use: Never    Sexual activity: Not on file     Review of Systems   Constitutional:  Negative for fatigue and fever.   Respiratory:  Negative for shortness of breath.    Gastrointestinal:  Negative for diarrhea and vomiting.    Objective:     Vital Signs (Most Recent):  Temp: 97.8 °F (36.6 °C) (06/17/22 1124)  Pulse: 95 (06/17/22 1230)  Resp: (!) 29 (06/17/22 1230)  BP: (!) 83/52 (06/17/22 1215)  SpO2: (!) 91 % (06/17/22 1230)   Vital Signs (24h Range):  Temp:  [96.1 °F (35.6 °C)-98.5 °F (36.9 °C)] 97.8 °F (36.6 °C)  Pulse:  [] 95  Resp:  [14-54] 29  SpO2:  [82 %-100 %] 91 %  BP: ()/(30-80) 83/52     Weight: 56.7 kg (125 lb)  Body mass index is 27.05 kg/m².    Foot Exam    General  Orientation: unable to assess       Right Foot/Ankle     Inspection and Palpation  Tenderness: none   Swelling: none   Skin Exam: cellulitis (right foot), ulcer (medial aspect of forefoot.  Fluctuant, bulky eschar present area that measures approximately 5 cm x 3 cm.  Ulceration well demarcated.  Upon removal of eschar, metatarsal head exposed.  Moderate amount of foul-smelling drainage.) and erythema (right foot);     Neurovascular  Dorsalis pedis: 1+  Posterior tibial: absent  Saphenous nerve sensation: absent  Tibial nerve sensation: absent  Superficial peroneal nerve sensation: absent  Deep peroneal nerve sensation: absent  Sural nerve sensation: absent      Left Foot/Ankle      Comments  History of left AKA    Laboratory:  Blood Cultures:   Recent Labs   Lab 06/16/22  1059 06/16/22  1120   LABBLOO No Growth to date Gram stain aer bottle: Gram positive cocci in clusters resembling Staph   Results called to and read back by: Brit Anthony RN  06/17/2022  06:33     CBC:   Recent Labs   Lab 06/17/22  0335   WBC 16.65*   RBC 2.88*   HGB 7.6*   HCT 25.1*      MCV 87   MCH 26.4*   MCHC 30.3*     CMP:   Recent Labs   Lab 06/17/22  0335   *   CALCIUM 7.5*   ALBUMIN 1.9*   PROT 6.4      K 3.9   CO2 18*   *   BUN 15   CREATININE 0.9   ALKPHOS 65   ALT 9*   AST 24   BILITOT 0.4     CRP:   Recent Labs   Lab 06/16/22  1058   CRP 8.17*     ESR: No results for input(s): SEDRATE in the last 168 hours.    Diagnostic Results:  X-Ray: I  have reviewed all pertinent results/findings within the past 24 hours.  Osteomyelitic changes present to first ray        Assessment/Plan:     Osteomyelitis of right foot  Osteomyelitis present to first ray.  When patient is stable she will need partial first ray amputation of right foot.  Tentatively plan for taking the patient to the OR Monday.    Diabetic ulcer of right foot  Fluctuant/boggy eschar debrided at the bedside using scissor and forceps.  Bone exposed under eschar.  Deep wound culture of ulceration taken.  Area cleansed.  Betadine dressing applied.  We'll have nurses continue with Betadine dressing the area daily.    PVD (peripheral vascular disease)  Arterial Doppler of right lower extremity     DM type 2 (diabetes mellitus, type 2)  Continue medical management of diabetes to aid in wound healing        Thank you for your consult. I will follow-up with patient. Please contact us if you have any additional questions.    Levi Mitchell, PHOENIXM  Podiatry  [place] - Intensive Care

## 2022-06-17 NOTE — HPI
51-year-old female with history of CVA with left-sided hemiparesis, hypertension, high cholesterol, diabetes, left AKA presents for evaluation of diabetic ulcer noted on right foot for the past 3 weeks progressively gotten worse.  Patient was at PCP earlier today and was instructed to come to the emergency department for evaluation.  Patient denies any vomiting, fever, chills.  Not on any antibiotics has follow-up with podiatrist on Tuesday  Pt also with multiple scratches all over body from itching - has multiple small bugs crawling all over  Overnight, had to be trasferred to ivu for low blood pressure - placed on levophed

## 2022-06-17 NOTE — RESPIRATORY THERAPY
06/17/22 1702   Patient Assessment/Suction   Level of Consciousness (AVPU) responds to pain   Respiratory Effort Mild;Labored;Shallow   Expansion/Accessory Muscles/Retractions abdominal muscle use   Rhythm/Pattern, Respiratory tachypneic;shallow;labored   PRE-TX-O2   O2 Device (Oxygen Therapy) BiPAP  (Placed patient on Bipap at this time per Dr. Pastrana's orders, Bhavna VELAZQUEZ aware)   $ Is the patient on Low Flow Oxygen? Yes   Oxygen Concentration (%) 100   SpO2 (!) 90 %   Pulse Oximetry Type Continuous   $ Pulse Oximetry - Multiple Charge Pulse Oximetry - Multiple   Pulse 98   Resp (!) 27   Positioning HOB elevated 45 degrees   Skin Integrity   Area Observed Left;Right;Cheek;Bridge of nose;Chin;Forehead   Skin Appearance without discoloration   Barrier used? Liquid Filled Cushion  (Liquicell Cushion placed on bridge of patient's nose)   Equipment Change   $ RT Equipment BiPAP machine;BiPAP/CPAP Circuit   Preset CPAP/BiPAP Settings   Mode Of Delivery BiPAP S/T   $ CPAP/BiPAP Daily Charge BiPAP/CPAP Daily   $ Initial CPAP/BiPAP Setup? Yes   $ Is patient using? Yes   Size of Mask Small   Sized Appropriately? Yes   Equipment Type Trilogy    Airway Device Type small full face mask   Humidifier not applicable   Ipap 10   EPAP (cm H2O) 5   Pressure Support (cm H2O) 5   ITime (sec) 1.3   Rise Time (sec) 2   Patient CPAP/BiPAP Settings   RR Total (Breaths/Min) 27   Tidal Volume (mL) 264   VE Minute Ventilation (L/min) 7.3 L/min   Peak Inspiratory Pressure (cm H2O) 10.1   Total Leak (L/Min) 38.7   Patient Trigger - ST Mode Only (%) 100   CPAP/BiPAP Backup Settings   Backup Rate 8 breaths per minute (bpm)   CPAP/BiPAP Alarms   Minute Ventilation (L/Min)   (low = 4.0 / high = 24.0)   High RR (breaths/min) 45   Low RR (breaths/min) 8   Apnea (Sec) 20   Respiratory Evaluation   $ Care Plan Tech Time 15 min   Evaluation For New Orders

## 2022-06-17 NOTE — ASSESSMENT & PLAN NOTE
Osteomyelitis present to first ray.  When patient is stable she will need partial first ray amputation of right foot.  Tentatively plan for taking the patient to the OR Monday.

## 2022-06-17 NOTE — SUBJECTIVE & OBJECTIVE
Scheduled Meds:   enoxaparin  40 mg Subcutaneous Daily    mupirocin   Nasal BID    piperacillin-tazobactam (ZOSYN) IVPB  4.5 g Intravenous Q8H    vancomycin (VANCOCIN) IVPB  750 mg Intravenous Q12H     Continuous Infusions:   sodium chloride 0.9% 125 mL/hr at 06/17/22 0733    NORepinephrine bitartrate-D5W 0.14 mcg/kg/min (06/17/22 1215)     PRN Meds:acetaminophen, acetaminophen, dextrose 10%, dextrose 10%, diphenhydrAMINE, glucagon (human recombinant), glucose, glucose, insulin aspart U-100, melatonin, ondansetron, sodium chloride 0.9%, Pharmacy to dose Vancomycin consult **AND** vancomycin - pharmacy to dose    Review of patient's allergies indicates:  No Known Allergies     Past Medical History:   Diagnosis Date    CVA (cerebral vascular accident) 02/2003    Diabetes mellitus     Gangrene 09/12/2019    l 5th toe    Hemiparesis     LEFT SIDE    Hyperlipidemia     Hypertension     Lice     6 weeks ago- family states she was treated for it    Multiple sclerosis     PVD (peripheral vascular disease)     Venous insufficiency      Past Surgical History:   Procedure Laterality Date    ABOVE-KNEE AMPUTATION Left 10/4/2019    Procedure: AMPUTATION, ABOVE KNEE left leg;  Surgeon: Justin Lopez MD;  Location: [facility] OR;  Service: General;  Laterality: Left;    ANGIOGRAPHY OF LOWER EXTREMITY Left 9/16/2019    Procedure: Angiogram Extremity Unilateral;  Surgeon: Berhane Colindres MD;  Location: [facility] CATH;  Service: Cardiology;  Laterality: Left;    DISARTICULATION OF HIP Left 1/13/2021    Procedure: DISARTICULATION, HIP;  Surgeon: THOM Whiteside II, MD;  Location: [facility] OR;  Service: Orthopedics;  Laterality: Left;  leg stump open and draining     PERCUTANEOUS TRANSLUMINAL ANGIOPLASTY (PTA) OF PERIPHERAL VESSEL N/A 9/13/2019    Procedure: PTA, PERIPHERAL VESSEL;  Surgeon: Berhane Colindres MD;  Location: [facility] CATH;  Service: Cardiology;  Laterality: N/A;    TOE AMPUTATION Left 9/19/2019    Procedure: AMPUTATION, TOE;   Surgeon: Justin Lopez MD;  Location: [facility] OR;  Service: General;  Laterality: Left;    WOUND DEBRIDEMENT Left 9/19/2019    Procedure: DEBRIDEMENT, WOUND;  Surgeon: Justin Lopez MD;  Location: [facility] OR;  Service: General;  Laterality: Left;    WOUND DEBRIDEMENT Left 1/8/2021    Procedure: DEBRIDEMENT, WOUND left AKA STUMP;  Surgeon: Nelson Radford MD;  Location: [facility] OR;  Service: General;  Laterality: Left;       Family History       Problem Relation (Age of Onset)    Diabetes Mother    Heart disease Mother          Tobacco Use    Smoking status: Never Smoker    Smokeless tobacco: Never Used   Substance and Sexual Activity    Alcohol use: Never    Drug use: Never    Sexual activity: Not on file     Review of Systems   Constitutional:  Negative for fatigue and fever.   Respiratory:  Negative for shortness of breath.    Gastrointestinal:  Negative for diarrhea and vomiting.   Objective:     Vital Signs (Most Recent):  Temp: 97.8 °F (36.6 °C) (06/17/22 1124)  Pulse: 95 (06/17/22 1230)  Resp: (!) 29 (06/17/22 1230)  BP: (!) 83/52 (06/17/22 1215)  SpO2: (!) 91 % (06/17/22 1230)   Vital Signs (24h Range):  Temp:  [96.1 °F (35.6 °C)-98.5 °F (36.9 °C)] 97.8 °F (36.6 °C)  Pulse:  [] 95  Resp:  [14-54] 29  SpO2:  [82 %-100 %] 91 %  BP: ()/(30-80) 83/52     Weight: 56.7 kg (125 lb)  Body mass index is 27.05 kg/m².    Foot Exam    General  Orientation: unable to assess       Right Foot/Ankle     Inspection and Palpation  Tenderness: none   Swelling: none   Skin Exam: cellulitis (right foot), ulcer (medial aspect of forefoot.  Fluctuant, bulky eschar present area that measures approximately 5 cm x 3 cm.  Ulceration well demarcated.  Upon removal of eschar, metatarsal head exposed.  Moderate amount of foul-smelling drainage.) and erythema (right foot);     Neurovascular  Dorsalis pedis: 1+  Posterior tibial: absent  Saphenous nerve sensation: absent  Tibial nerve sensation: absent  Superficial peroneal nerve  sensation: absent  Deep peroneal nerve sensation: absent  Sural nerve sensation: absent      Left Foot/Ankle      Comments  History of left AKA    Laboratory:  Blood Cultures:   Recent Labs   Lab 06/16/22  1059 06/16/22  1120   LABBLOO No Growth to date Gram stain aer bottle: Gram positive cocci in clusters resembling Staph   Results called to and read back by: Brit Anthony RN  06/17/2022  06:33     CBC:   Recent Labs   Lab 06/17/22  0335   WBC 16.65*   RBC 2.88*   HGB 7.6*   HCT 25.1*      MCV 87   MCH 26.4*   MCHC 30.3*     CMP:   Recent Labs   Lab 06/17/22  0335   *   CALCIUM 7.5*   ALBUMIN 1.9*   PROT 6.4      K 3.9   CO2 18*   *   BUN 15   CREATININE 0.9   ALKPHOS 65   ALT 9*   AST 24   BILITOT 0.4     CRP:   Recent Labs   Lab 06/16/22  1058   CRP 8.17*     ESR: No results for input(s): SEDRATE in the last 168 hours.    Diagnostic Results:  X-Ray: I have reviewed all pertinent results/findings within the past 24 hours.  Osteomyelitic changes present to first ray

## 2022-06-17 NOTE — NURSING
Spoke to Dr. Haney per telephone about pt. Low blood pressure of  80/54, new order given for 1 liter bolus and to let know if blood pressure doesn't come up, will continue to monitor.

## 2022-06-17 NOTE — PLAN OF CARE
Problem: Adult Inpatient Plan of Care  Goal: Plan of Care Review  Outcome: Ongoing, Progressing  Goal: Patient-Specific Goal (Individualized)  Outcome: Ongoing, Progressing  Goal: Absence of Hospital-Acquired Illness or Injury  Outcome: Ongoing, Progressing  Goal: Optimal Comfort and Wellbeing  Outcome: Ongoing, Progressing  Goal: Readiness for Transition of Care  Outcome: Ongoing, Progressing     Problem: Diabetes Comorbidity  Goal: Blood Glucose Level Within Targeted Range  Outcome: Ongoing, Progressing     Problem: Impaired Wound Healing  Goal: Optimal Wound Healing  Outcome: Ongoing, Progressing     Problem: Infection  Goal: Absence of Infection Signs and Symptoms  Outcome: Ongoing, Progressing     Problem: Fall Injury Risk  Goal: Absence of Fall and Fall-Related Injury  Outcome: Ongoing, Progressing     Problem: Skin Injury Risk Increased  Goal: Skin Health and Integrity  Outcome: Ongoing, Progressing     Problem: Restraint, Nonbehavioral (Nonviolent)  Goal: Absence of Harm or Injury  Outcome: Ongoing, Progressing     Problem: Adjustment to Illness (Sepsis/Septic Shock)  Goal: Optimal Coping  Outcome: Ongoing, Progressing     Problem: Bleeding (Sepsis/Septic Shock)  Goal: Absence of Bleeding  Outcome: Ongoing, Progressing     Problem: Glycemic Control Impaired (Sepsis/Septic Shock)  Goal: Blood Glucose Level Within Desired Range  Outcome: Ongoing, Progressing     Problem: Infection Progression (Sepsis/Septic Shock)  Goal: Absence of Infection Signs and Symptoms  Outcome: Ongoing, Progressing     Problem: Nutrition Impaired (Sepsis/Septic Shock)  Goal: Optimal Nutrition Intake  Outcome: Ongoing, Progressing

## 2022-06-17 NOTE — RESPIRATORY THERAPY
Error in ABG documentation. Patient on 5lpm nasal cannula at time of ABG. Chart correction request sent to Epic support. Bhavna VELAZQUEZ notified.

## 2022-06-18 NOTE — NURSING
Telephoned  with ABG results, will increase bipap settings to 14/5 and if patient does not improve will likely have to intubate. New orders for lasix 20mg IVP now noted.

## 2022-06-18 NOTE — NURSING
2216 - ER staff in unit, bagging remains in progress, HR 30's-40's, no palpable pulse, CPR started, ACLS protocol followed throughout. EPI X2 given, no ROSC, code called at 2224 per Dr. Jung. Dr. Pastrana notified, ok to release the body

## 2022-06-18 NOTE — NURSING
2200 - patients sats 70's, RR decreased to 12 from 33, HR starting to drop. Telephoned Dr. Pastrana to inform of need for intubation, ER doc called to come to unit, bagging in process.

## 2022-06-18 NOTE — PROVIDER PROGRESS NOTES - EMERGENCY DEPT.
Encounter Date: 6/16/2022    ED Physician Progress Notes        Charting delayed due to patient care    Called to ICU as patient's respiratory status was declining and there was need for emergent intubation.  As I was getting prepared for intubation patient went in to asystolic arrest.  Immediate CPR started.  Patient was bagged during CPR.  Patient was in asystolic/PEA bradycardic arrest on pulse check.  Given epinephrine and chest compressions continued.   Patient remained in PEA arrest without any signs of response to CPR. Time of death called at 2224.    Griffin Jung

## 2022-06-18 NOTE — PROGRESS NOTES
Pharmacokinetic Assessment Follow Up: IV Vancomycin    Vancomycin serum concentration assessment(s):    The trough level was drawn correctly and can be used to guide therapy at this time. The measurement is above the desired definitive target range of 15 to 20 mcg/mL.   - The trough level was drawn ~8 hours after previous dose, which is very early. It resulted at 35.6. Despite it being early, the true trough is likely still supra-therapeutic.     Vancomycin Regimen Plan:    Discontinue the scheduled vancomycin regimen and re-dose when the random level is less than 20 mcg/mL, next level to be drawn at 1300 on 6/18.   - No major change in Scr, but given that the level was so high, will stop scheduled dose for now.  - Getting a random level ~24 hours after previous dose to see if she will tolerate a Q24H regimen or not.     Drug levels (last 3 results):  Recent Labs   Lab Result Units 06/17/22  2130   Vancomycin-Trough ug/mL 35.6*       Pharmacy will continue to follow and monitor vancomycin.    Please contact pharmacy for questions regarding this assessment.    Thank you for the consult,   Ni Fairbanks       Patient brief summary:  Pauly Sharma is a 51 y.o. female initiated on antimicrobial therapy with IV Vancomycin for treatment of skin & soft tissue infection    The patient's current regimen is pulse dosing until we determine estimated clearance.    Actual Body Weight:   56.7 kg    Renal Function:   Estimated Creatinine Clearance: 59.7 mL/min (based on SCr of 0.9 mg/dL).     Dialysis Method (if applicable):  N/A

## 2022-06-18 NOTE — NURSING
0216 - Telephoned patients sister, Mariann, and informed her of patients condition and the possible need for intubation. She states she will call her brother.

## 2022-06-18 NOTE — CARE UPDATE
06/17/22 2110   PRE-TX-O2   SpO2 (!) 81 %   Pulse 104   Resp (!) 32   Preset CPAP/BiPAP Settings   Ipap (S)  14   EPAP (cm H2O) (S)  8   Pressure Support (cm H2O) 6

## 2022-06-19 LAB
BACTERIA BLD CULT: ABNORMAL

## 2022-06-20 LAB — BACTERIA SPEC AEROBE CULT: ABNORMAL

## 2022-06-21 LAB
BACTERIA BLD CULT: NORMAL
BACTERIA SPEC ANAEROBE CULT: NORMAL

## 2022-06-21 NOTE — DISCHARGE SUMMARY
[facility] Medicine  Discharge Summary      Patient Name: Pauly Sharma  MRN: [id number]  Patient Class: IP- Inpatient  Admission Date: 6/16/2022  Hospital Length of Stay: 2 days  Discharge Date and Time:      Attending Physician: No att. providers found   Discharging Provider: Zenaida Pastrana MD  Primary Care Provider: CHEN Fournier      HPI:   51-year-old female with history of CVA with left-sided hemiparesis, hypertension, high cholesterol, diabetes, left AKA presents for evaluation of diabetic ulcer noted on right foot for the past 3 weeks progressively gotten worse.  Patient was at PCP earlier today and was instructed to come to the emergency department for evaluation.  Patient denies any vomiting, fever, chills.  Not on any antibiotics has follow-up with podiatrist on Tuesday  Pt also with multiple scratches all over body from itching - has multiple small bugs crawling all over  Overnight, had to be trasferred to ivu for low blood pressure - placed on levophed      * No surgery found *      Hospital Course:     Throughout the day of 6/16/2022 - pts respiratory status slowly declined.  She was placed on bipap and had that adjusted.  Pt was already on pressor support.  Pt sats were stayign ingth low 80s and we were going to intubate pt.  At that time, pt went bradycardic despite being bagged with 100% Fio2.  Pt then went aystole - despite cpr and epi - pt remianed in PEA - pt was pronounced dead on 6/16/22 @2224  Cause of death : sepsis; osteomyleitis of rigth foot       Goals of Care Treatment Preferences:  Code Status: Full Code      Consults:     * Severe sepsis  This patient does have evidence of infective focus  My overall impression is septic shock.  Source: rigth foot osteomyelitis  Antibiotics given-   Antibiotics (From admission, onward)            None        Latest lactate reviewed-  No results for input(s): LACTATE in the last 72 hours.  Organ dysfunction  indicated by encephalopathy    Shock with decreased perfusion noted, Fluid challenge  was given at 30cc/kg and was not given at 30cc/kg due to initally didnt seem to have sepsis - once bp decreased - pt was started on more aggressive ivfs    Post- resuscitation assessment- (Done after fluids given for shock)  Vital signs post fluid administrations were-  Temp Readings from Last 1 Encounters:   22 97.2 °F (36.2 °C)     BP Readings from Last 1 Encounters:   22 (!) 153/108     Pulse Readings from Last 1 Encounters:   22 (!) 115       Perfusion exam was performed within 6 hours of septic shock presentation after bolus shows Inadequate tissue perfusion assessed by non-invasive monitoring  Will Start Pressors- Levophed for MAP of 65  Source control achieved by: iv abxs    Pt  of overwhelming infection on 2022 @ 2224      Pyogenic inflammation of bone  Podiatry consult - cont iv abxs    X-Ray Foot Complete Right [[id number]] Resulted: 22 1224   Order Status: Completed Updated: 22 1226   Narrative:     EXAMINATION:   XR FOOT COMPLETE 3 VIEW RIGHT     CLINICAL HISTORY:   Type 2 diabetes mellitus with foot ulcer     COMPARISON:   None     FINDINGS:   Right foot radiographs, three views, demonstrate erosion of the right 1st metatarsal head and neck.  Suspected oblique nondisplaced pathologic fracture mid diaphysis of the right 1st metatarsal with adjacent erosion.  Erosion of the medial proximal aspect of the right 1st proximal phalanx.  There is adjacent soft tissue swelling with soft tissue gas.  Diffuse osteopenia.    Impression:       1. Osteomyelitis involving the right 1st metatarsal and right 1st proximal phalanx.   2. A suspected oblique nondisplaced pathologic fracture at the mid diaphysis of the right 1st metatarsal, identified on one view only.       Electronically signed by: Shankar Renteria MD   Date: 2022   Time: 12:24         Lice  Had treatment at home - will apply  vasoline to head to suffocate the bugs and may need to reapply treatment after that      Diabetic ulcer of right foot  Source of infection    Anemia  H/h was holding      PVD (peripheral vascular disease)  lovenox at dvt dosing  Will restart statin once able to toelrate po      DM type 2 (diabetes mellitus, type 2)          Final Active Diagnoses:    Diagnosis Date Noted POA    PRINCIPAL PROBLEM:  Severe sepsis [A41.9, R65.20] 2022 Yes    Pyogenic inflammation of bone [M86.9] 2022 Unknown    Hypomagnesemia [E83.42] 2022 Yes    Scabies [B86] 2022 Yes    Osteomyelitis of right foot [M86.9] 2022 Yes    Lice [B85.2] 2021 Yes    Diabetic ulcer of right foot [E11.621, L97.519] 10/03/2019 Yes    Anemia [D64.9] 2019 Yes    PVD (peripheral vascular disease) [I73.9] 2019 Yes    DM type 2 (diabetes mellitus, type 2) [E11.9] 2019 Yes     Chronic      Problems Resolved During this Admission:       Discharged Condition:     Disposition:     Follow Up:    Patient Instructions:   No discharge procedures on file.    Significant Diagnostic Studies: Labs: All labs within the past 24 hours have been reviewed    Pending Diagnostic Studies:     None         Medications:  None    Indwelling Lines/Drains at time of discharge:   Lines/Drains/Airways     Central Venous Catheter Line  Duration           Percutaneous Central Line Insertion/Assessment - Triple Lumen  22 1617 left subclavian 4 days          Drain  Duration                Urethral Catheter 22 0950 Non-latex 16 Fr. 4 days                Time spent on the discharge of patient:  minutes         Zenaida Pastrana MD  Department of Hospital Medicine  [facility] - Intensive Care

## 2022-06-21 NOTE — ASSESSMENT & PLAN NOTE
Podiatry consult - cont iv abxs    X-Ray Foot Complete Right [[id number]] Resulted: 06/16/22 1224   Order Status: Completed Updated: 06/16/22 1226   Narrative:     EXAMINATION:   XR FOOT COMPLETE 3 VIEW RIGHT     CLINICAL HISTORY:   Type 2 diabetes mellitus with foot ulcer     COMPARISON:   None     FINDINGS:   Right foot radiographs, three views, demonstrate erosion of the right 1st metatarsal head and neck.  Suspected oblique nondisplaced pathologic fracture mid diaphysis of the right 1st metatarsal with adjacent erosion.  Erosion of the medial proximal aspect of the right 1st proximal phalanx.  There is adjacent soft tissue swelling with soft tissue gas.  Diffuse osteopenia.    Impression:       1. Osteomyelitis involving the right 1st metatarsal and right 1st proximal phalanx.   2. A suspected oblique nondisplaced pathologic fracture at the mid diaphysis of the right 1st metatarsal, identified on one view only.       Electronically signed by: Shankar Renteria MD   Date: 06/16/2022   Time: 12:24

## 2022-06-21 NOTE — HOSPITAL COURSE
Throughout the day of 6/16/2022 - pts respiratory status slowly declined.  She was placed on bipap and had that adjusted.  Pt was already on pressor support.  Pt sats were stayign ingth low 80s and we were going to intubate pt.  At that time, pt went bradycardic despite being bagged with 100% Fio2.  Pt then went aystole - despite cpr and epi - pt remianed in PEA - pt was pronounced dead on 6/16/22 @2224  Cause of death : sepsis; osteomyleitis of rigth foot

## 2022-06-22 NOTE — PHYSICIAN QUERY
PT Name: Pauly Sharma  MR #: [id number]     DOCUMENTATION CLARIFICATION      CDS: Brandy Capley, RN  Email: [email or web address]    This form is a permanent document in the medical record.    Query Date: June 22, 2022    By submitting this query, we are merely seeking further clarification of documentation to reflect the severity of illness of your patient. Please utilize your independent clinical judgment when addressing the question(s) below.     The Medical Record contains the following:   Indicators   Supporting Clinical Findings Location in Medical Record   X Documentation of Sepsis, Septic Shock   Patient admitted to [facility] for sepsis secondary to diabetic foot ulcer/infection.     Podiatry consult 6/17   X Blood Culture STAPHYLOCOCCUS AUREUS   COAGULASE-NEGATIVE STAPHYLOCOCCUS SPECIES Multiple morphotyes - probable contaminates    STAPHYLOCOCCUS AUREUS  Many  Specimen Type: Wound  Specimen Source: Foot, Right   Blood culture 6/16 1120      Cultures 6/17    Respiratory Culture      Urine Culture      Other Culture     X Acute/Chronic Illness Patient is somewhat known to myself.  She has a history of AKA due to similar issues on the left lower extremity.  I last saw her towards the end of last year.  Had significant peripheral vascular disease, needed revascularization by [facility].  Patient has not followed up since the end of last year.  I was consult for evaluation and management of diabetic ulceration.  X-rays taken in the ER show bony changes to the first ray consistent with osteomyelitis.  Patient is agitated in bed, due to the bed bugs/scabies/lice infection.    Osteomyelitis of right foot  Osteomyelitis present to first ray.  When patient is stable she will need partial first ray amputation of right foot.  Tentatively plan for taking the patient to the OR Monday.     Diabetic ulcer of right foot  Fluctuant/boggy eschar debrided at the bedside using scissor and forceps.  Bone exposed under eschar.  Deep wound  culture of ulceration taken.  Area cleansed.  Betadine dressing applied.  We'll have nurses continue with Betadine dressing the area daily.    Podiatry consult 6/17   X Medication/Treatment Continue antibiotics with Zosyn and vancomycin   [facility]    Other        Provider, please further specify the ___Sepsis____ diagnosis (select all that apply):     [ x  ] Due to MSSA   [   ] Due to Coag negative staphylococcus   [   ] Due to (please specify): __________________________________   [   ] Other specification (please specify): ____________________   [   ] Clinically Undetermined       Please document in your progress notes daily for the duration of treatment until resolved, and include in your discharge summary.  Form No. 12085

## 2022-06-22 NOTE — PHYSICIAN QUERY
"PT Name: Pauly Sharma  MR #: [id number]    DOCUMENTATION CLARIFICATION     CDS: Brandy Capley, RN  Email: [email or web address]    This form is a permanent document in the medical record.    Query Date: June 22, 2022  By submitting this query, we are merely seeking further clarification of documentation. Please utilize your independent clinical judgment when addressing the question(s) below.    The Medical Record contains the following:   Indicator Supporting Clinical Findings Location in Medical Record   X Documentation of "Debridement"   Diabetic ulcer of right foot  Fluctuant/boggy eschar debrided at the bedside using scissor and forceps.  Bone exposed under eschar.  Deep wound culture of ulceration taken.  Area cleansed.  Betadine dressing applied.     Podiatry consult 6/17    Documentation of "I&D"      Other       Excisional debridement is the surgical removal or cutting away of such tissue, necrosis, or slough and is classified to the root operation "Excision." Use of a sharp instrument does not always indicate that an excisional debridement was performed. Minor removal of loose fragments with scissors or using a sharp instrument to scrape away tissue is not an excisional debridement. Excisional debridement involves the use of a scalpel to remove devitalized tissue.  Nonexcisional debridement is the nonoperative brushing, irrigating, scrubbing, or washing of devitalized tissue, necrosis, slough, or foreign material. Most nonexcisional debridement procedures are classified to the root operation "Extraction" (pulling or stripping out or off all or a portion of a body part by the use of force).     Provider, please provide further clarification on the procedure performed on _______right foot             :    [   ] Excisional Debridement of skin   [   ] Excisional Debridement of subcutaneous tissue/fascia   [   ] Excisional Debridement of muscle   [  X ] Excisional Debridement of tendon   [   ] Excisional " Debridement of bone        [   ] Non-excisional Debridement of skin   [   ] Non-excisional Debridement of subcutaneous tissue/fascia   [   ] Non-excisional Debridement of muscle   [   ] Non-excisional Debridement of tendon   [   ] Non-excisional Debridement of bone     [   ] Other Procedure (please specify): _____________   [  ] Clinically Undetermined     Reference:    ICD-10-CM/PCS Coding Clinic Third Quarter ICD-10, Effective with discharges: October 7, 2015 [facility] § Excisional and nonexcisional debridement (2015).    Form No. 64214

## 2022-06-22 NOTE — PHYSICIAN QUERY
PT Name: Pauly Sharma  MR #: [id number]     DOCUMENTATION CLARIFICATION     CDS: Brandy Capley, RN  Email: [email or web address]    This form is a permanent document in the medical record.     Query Date: June 22, 2022    By submitting this query, we are merely seeking further clarification of documentation.  Please utilize your independent clinical judgment when addressing the question(s) below.  The Medical Record contains the following   Indicators   Supporting Clinical Findings Location in Medical Record   X SOB, HUANG, Wheezing, Productive Cough, Use of Accessory Muscles, etc.   1219 notified Dr Pastrana on increased work of breathing she ordered an ABG and to call anesthesia for an ART line.     Nursing note 6/17 0800, filed 6/18 0846   X RR         ABGs         O2 sat  06/17/22 12:45 06/17/22 21:01   POC PH 7.276 (LL) 7.189 (LL)   POC PCO2 29.0 (L) 49.6 (H)   POC PO2 58.3 (LL) 54.1 (LL)   POC HCO3 14.7 17.0   POC SATURATED O2 86.2 76.7   POC TCO2 13.2 18.8   FiO2 40.0 100.0   O2DEVICE Nasal Can (C) BIPAP     2200 - patients sats 70's, RR decreased to 12 from 33, HR starting to drop. Telephoned Dr. Pastrana to inform of need for intubation, ER doc called to come to unit, bagging in process.   ABG's 6/17                                Nursing note filed 6/17 3163    Hypoxia/Hypercapnia     X BiPAP/Intubation/Mechanical Ventilation Called to ICU as patient's respiratory status was declining and there was need for emergent intubation.  As I was getting prepared for intubation patient went in to asystolic arrest.  Immediate CPR started.  Patient was bagged during CPR.  Patient was in asystolic/PEA bradycardic arrest on pulse check.  Given epinephrine and chest compressions continued.   Patient remained in PEA arrest without any signs of response to CPR. Time of death called at 2224.   Emergency medicine provider progress notes 6/18   X Supplemental O2 1255 pt placed on a nonrebreather mask at 15L 100% O2    1645   marybeth notified of desat after pt laying for procedure she ordered BIPAP to be placed on patient respiratory team notified   Nursing note 6/17 0800, filed 6/18 0846    Home O2, Oxygen Dependence      Respiratory distress or failure     X Radiology findings Dense diffuse bilateral pulmonary airspace opacities suggesting multilobar pneumonia right greater than left.  Overlying pleural effusions may be present.   CXR 6/17   X Acute/Chronic Illness HPI:   51-year-old female with history of CVA with left-sided hemiparesis, hypertension, high cholesterol, diabetes, left AKA presents for evaluation of diabetic ulcer noted on right foot for the past 3 weeks progressively gotten worse    * Severe sepsis    Scabies    Hypomagnesemia    Pyogenic inflammation of bone    Lice     Diabetic ulcer of right foot    Anemia    PVD (peripheral vascular disease)    DM type 2 (diabetes mellitus, type 2) H&P 6/17    Treatment      Other         The noted clinical guidelines are only system guidelines and do not replace the providers clinical judgment.    Provider, please specify the diagnosis or diagnoses associated with above clinical findings.     [   x ] Acute Respiratory Failure with Hypoxia - ABG pO2 < 60 mmHg or O2 sat of <91% on room air and respiratory symptoms documented     [    ] Other Respiratory Diagnosis (please specify): _________________     [   ]   Clinically Undetermined       Please document in your progress notes daily for the duration of treatment until resolved and include in your discharge summary.     Reference:    CLIFF العلي MD. (2020, March 13). Acute respiratory distress syndrome: Clinical features, diagnosis, and complications in adults ([id number] FER Crenshaw MD & [id number] JEMMA Murray MD, Eds.). Retrieved November 13, 2020, from  https://[email or web address]-respiratory-distress-syndrome-clinical-features-diagnosis-and-complications-in-adults?search=ards&source=search_result&selectedTitle=1~150&usage_type=default&display_rank=1  Form No. [id number]